# Patient Record
Sex: FEMALE | Race: BLACK OR AFRICAN AMERICAN | NOT HISPANIC OR LATINO | Employment: UNEMPLOYED | URBAN - METROPOLITAN AREA
[De-identification: names, ages, dates, MRNs, and addresses within clinical notes are randomized per-mention and may not be internally consistent; named-entity substitution may affect disease eponyms.]

---

## 2018-06-22 ENCOUNTER — OFFICE VISIT (OUTPATIENT)
Dept: FAMILY MEDICINE CLINIC | Facility: CLINIC | Age: 43
End: 2018-06-22

## 2018-06-22 ENCOUNTER — LAB (OUTPATIENT)
Dept: LAB | Facility: HOSPITAL | Age: 43
End: 2018-06-22

## 2018-06-22 VITALS
DIASTOLIC BLOOD PRESSURE: 64 MMHG | TEMPERATURE: 97.4 F | BODY MASS INDEX: 40.26 KG/M2 | HEIGHT: 63 IN | HEART RATE: 75 BPM | RESPIRATION RATE: 20 BRPM | WEIGHT: 227.2 LBS | OXYGEN SATURATION: 99 % | SYSTOLIC BLOOD PRESSURE: 118 MMHG

## 2018-06-22 DIAGNOSIS — L83 ACANTHOSIS NIGRICANS: ICD-10-CM

## 2018-06-22 DIAGNOSIS — M79.89 FOOT SWELLING: ICD-10-CM

## 2018-06-22 DIAGNOSIS — L70.0 ACNE VULGARIS: Primary | ICD-10-CM

## 2018-06-22 DIAGNOSIS — IMO0001 CLASS 3 OBESITY DUE TO EXCESS CALORIES WITHOUT SERIOUS COMORBIDITY WITH BODY MASS INDEX (BMI) OF 40.0 TO 44.9 IN ADULT: ICD-10-CM

## 2018-06-22 PROCEDURE — 99203 OFFICE O/P NEW LOW 30 MIN: CPT | Performed by: FAMILY MEDICINE

## 2018-06-22 PROCEDURE — 83036 HEMOGLOBIN GLYCOSYLATED A1C: CPT

## 2018-06-22 PROCEDURE — 36415 COLL VENOUS BLD VENIPUNCTURE: CPT

## 2018-06-22 RX ORDER — CYCLOBENZAPRINE HCL 10 MG
10 TABLET ORAL DAILY PRN
COMMUNITY
End: 2021-03-30

## 2018-06-22 RX ORDER — METHOCARBAMOL 750 MG/1
1 TABLET, FILM COATED ORAL DAILY
COMMUNITY
Start: 2018-05-31 | End: 2021-03-30

## 2018-06-22 NOTE — PROGRESS NOTES
H/o irregular cells on pap smears. Planning on tubes tied, but unable to do procedure. Not currently sexually active or planning on pregnancy. Has 2 twin boys.     Back pain:  Major, ongoing back pain for 3 years. After birth of sons back pain worse. Couldn't sit or stand for too long, feels stuck like can't stretch out. Followed by chiropractor., back adjusted once a month up to 2 times a month. Deteriorating bone.  Sciatica mainly right side, sometimes both. Month and half ago went to ER for severe pain, given Toradol and helped. Constant, daily pain. Planning core exercises, but difficulty doing them. Has to roll out of bed in morning. Stiffness in the morning. Start PT next week. Takes muscle relaxer at night to help sleep, new med methocarbamol, previous cyclobenazprine sometimes because it knocks her out.     Hips also go in and out of place, has to be adjusted.     Worried about arthritis in bilateral shoulders and back. No h/o scoliosis.     Cysts under arm:  Left side, 4. No pain. No drainage. Started during pregnancy. No h/o infection.    Acne:  Onset as teenager. Itchying on chest. Involved areas include face and chest. Occasional arms, no known on back. No cysts. Previous used Kerrie, Clinique, and OTC salicylic acid. Worse with caffeine (coffee and sweet tea) or before periods.

## 2018-06-22 NOTE — PROGRESS NOTES
Crissy Grady presents today to establish care.    Chief Complaint   Patient presents with   • Establish Care        HPI   Abnormal pap smears.  H/o irregular cells on pap smears. Planning on tubes tied, but unable to do procedure. Not currently sexually active or planning on pregnancy. Has 2 twin boys. Last saw GYN 2015.     Back pain:  Major, ongoing back pain for 3 years.  After birth of sons back pain worse. Couldn't sit or stand for too long, feels stuck like can't stretch out. Followed by chiropractor., back adjusted once a month up to 2 times a month. Deteriorating bone.  Sciatica mainly right side, sometimes both. Month and half ago went to ER for severe pain, given Toradol and helped. Constant, daily pain. Planning core exercises, but difficulty doing them. Has to roll out of bed in morning. Stiffness in the morning. Start PT next week. Takes muscle relaxer at night to help sleep, new med methocarbamol, previous cyclobenzaprine sometimes because it knocks her out.     Hips also go in and out of place, has to be adjusted.     Worried about arthritis in bilateral shoulders and back. No h/o scoliosis.     Cysts under arm:  Left side, 4. No pain. No drainage. Started during pregnancy. No h/o infection.    Acne:  Onset as teenager. Itching on chest. Involved areas include face and chest. Occasional arms, no known on back. No cysts. Previous used Kerrie, Clinique, and OTC salicylic acid. Worse with caffeine (coffee and sweet tea) or before periods.     Foot:  Swelling started during pregnancy. Denies pain.     Review of Systems   Constitutional: Negative.    HENT: Negative.    Eyes: Negative.    Respiratory: Negative.    Cardiovascular: Negative.    Gastrointestinal: Negative.    Endocrine: Negative.    Genitourinary: Negative.    Musculoskeletal: Positive for back pain and joint swelling.   Skin:        Acne   Neurological: Negative.    Hematological: Negative.    Psychiatric/Behavioral: Negative.         Past  "Medical History:   Diagnosis Date   • Abnormal Pap smear of cervix    • Plantar fasciitis         Past Surgical History:   Procedure Laterality Date   •  SECTION      2013   • LEEP       &         Family History   Problem Relation Age of Onset   • Hypertension Mother    • Hypertension Father    • Cancer Paternal Grandfather    • Diabetes Maternal Grandmother         Social History     Social History   • Marital status: Single     Spouse name: N/A   • Number of children: N/A   • Years of education: N/A     Occupational History   • Not on file.     Social History Main Topics   • Smoking status: Never Smoker   • Smokeless tobacco: Never Used   • Alcohol use No   • Drug use: No   • Sexual activity: Defer     Other Topics Concern   • Not on file     Social History Narrative   • No narrative on file        Prior Outpatient Prescriptions   Medication Sig Dispense Refill   • cyclobenzaprine (FLEXERIL) 10 MG tablet Take 10 mg by mouth Daily As Needed for Muscle Spasms.     • methocarbamol (ROBAXIN) 750 MG tablet Take 1 tablet by mouth Daily.       Current Outpatient Prescriptions   Medication Sig Dispense Refill   • cyclobenzaprine (FLEXERIL) 10 MG tablet Take 10 mg by mouth Daily As Needed for Muscle Spasms.     • methocarbamol (ROBAXIN) 750 MG tablet Take 1 tablet by mouth Daily.     • benzoyl peroxide 5 % gel Apply  topically Daily. 56.7 g 11     No current facility-administered medications for this visit.              Allergies no known allergies     Visit Vitals  /64 (BP Location: Right arm, Patient Position: Sitting)   Pulse 75   Temp 97.4 °F (36.3 °C) (Temporal Artery )   Resp 20   Ht 159.5 cm (62.8\")   Wt 103 kg (227 lb 3.2 oz)   LMP 2018   SpO2 99%   Breastfeeding? No   BMI 40.51 kg/m²        Physical Exam   Constitutional: No distress.   Obese   Neck: No thyromegaly present.   Cardiovascular: Normal rate, regular rhythm and intact distal pulses.    No murmur heard.  Pulmonary/Chest: " Effort normal and breath sounds normal.   Musculoskeletal:        Right ankle: She exhibits swelling.        Left ankle: She exhibits swelling.        Lumbar back: She exhibits tenderness. She exhibits no spasm. Bony tenderness: mild.   Bilateral ankle anterior to lateral malleolus soft tissue swelling indistinct, nontender, non-erythematous   Lymphadenopathy:     She has no cervical adenopathy.   Neurological: Gait normal.   Skin: Skin is warm and dry.   Facial and acne with numerous open and closed comedones, mild postinflammatory macules, no cysts or nodules.  Posterior upper back open and closed comedones.  Posterior and lateral neck folds, bilateral axilla increased hyperpigmentation consistent with acanthosis nigricans.    Psychiatric: She has a normal mood and affect. Her behavior is normal. Judgment and thought content normal. Cognition and memory are normal.   Vitals reviewed.       No results found for this or any previous visit.     Crissy was seen today for establish care.    Diagnoses and all orders for this visit:    Acne vulgaris  -     benzoyl peroxide 5 % gel; Apply  topically Daily.  Discussed use of Retin-A cream for comedone acne risks and benefits, she decided on other options at this time.  Start benzoyl peroxide, cautioned on bleaching properties.  Advised may take several weeks to notice improvement.  Acanthosis nigricans  -     Hemoglobin A1c; Future  Discussed skin changes are concerning for insulin resistance.  Check labs today.  Family history of grandmother with diabetes.  Class 3 obesity due to excess calories without serious comorbidity with body mass index (BMI) of 40.0 to 44.9 in adult  -     Hemoglobin A1c; Future  Discussed changing sweetened drinks.   Foot swelling  -     Ambulatory Referral to Podiatry  Unclear etiology, recommend further evaluation with podiatry.

## 2018-06-25 ENCOUNTER — TELEPHONE (OUTPATIENT)
Dept: FAMILY MEDICINE CLINIC | Facility: CLINIC | Age: 43
End: 2018-06-25

## 2018-06-25 DIAGNOSIS — L70.0 ACNE VULGARIS: Primary | ICD-10-CM

## 2018-06-25 NOTE — TELEPHONE ENCOUNTER
Called pt. Let her know that Dr Carol Roldan sent in a script for Trentinoin cream to be used at night. Pt verbalized understanding and had no further questions.

## 2018-06-25 NOTE — TELEPHONE ENCOUNTER
Patient called and is wanting to know if Dr. Frazier can change her prescription from the Benzoyl Peroxide 5 % gel to the other medication that they discussed. She couldn't remember the name but she said it is supposed to peel the skin. Wants it sent to [facility] on [facility].

## 2018-06-27 ENCOUNTER — TRANSCRIBE ORDERS (OUTPATIENT)
Dept: PHYSICAL THERAPY | Facility: HOSPITAL | Age: 43
End: 2018-06-27

## 2018-06-27 ENCOUNTER — HOSPITAL ENCOUNTER (OUTPATIENT)
Dept: PHYSICAL THERAPY | Facility: HOSPITAL | Age: 43
Setting detail: THERAPIES SERIES
Discharge: HOME | End: 2018-06-27

## 2018-06-27 DIAGNOSIS — G89.29 CHRONIC BILATERAL LOW BACK PAIN WITH RIGHT-SIDED SCIATICA: Primary | ICD-10-CM

## 2018-06-27 DIAGNOSIS — M54.5 LOW BACK PAIN, UNSPECIFIED BACK PAIN LATERALITY, UNSPECIFIED CHRONICITY, WITH SCIATICA PRESENCE UNSPECIFIED: Primary | ICD-10-CM

## 2018-06-27 DIAGNOSIS — M54.41 CHRONIC BILATERAL LOW BACK PAIN WITH RIGHT-SIDED SCIATICA: Primary | ICD-10-CM

## 2018-06-27 LAB — HBA1C MFR BLD: 5.7 % (ref 4.8–5.6)

## 2018-06-27 PROCEDURE — 97161 PT EVAL LOW COMPLEX 20 MIN: CPT | Performed by: PHYSICAL THERAPIST

## 2018-06-27 PROCEDURE — 97110 THERAPEUTIC EXERCISES: CPT | Performed by: PHYSICAL THERAPIST

## 2018-06-27 NOTE — THERAPY EVALUATION
"    Outpatient Physical Therapy Ortho Initial Evaluation  [facility]     Patient Name: Crissy Grady  : 1975  MRN: [id number]  Today's Date: 2018      Visit Date: 2018    Patient Active Problem List   Diagnosis   • Acanthosis nigricans   • Acne vulgaris        Past Medical History:   Diagnosis Date   • Abnormal Pap smear of cervix    • Plantar fasciitis         Past Surgical History:   Procedure Laterality Date   •  SECTION         • LEEP       &        Visit Dx:     ICD-10-CM ICD-9-CM   1. Chronic bilateral low back pain with right-sided sciatica M54.41 724.2    G89.29 724.3     338.29             Patient History     Row Name 18 1000             History    Chief Complaint Pain  -MARIETTA      Type of Pain Back pain  -MARIETTA      Date Current Problem(s) Began 06/27/15   \" 3 years\"  -MARIETTA      Brief Description of Current Complaint Patient has been receiving chiro care x 3 years for lower back pain with LE symptoms.  She has tried to exercise but has had difficulty beginning.  She gets temporary relief with chiro (3 days).    -MARIETTA      Patient/Caregiver Goals Relieve pain  -MARIETTA      Current Tobacco Use nonuser  -MARIETTA      Patient's Rating of General Health Fair  -MRAIETTA      Hand Dominance right-handed  -MARIETTA      Occupation/sports/leisure activities [employer]  -MARIETTA      What clinical tests have you had for this problem? X-ray  -MARIETTA      Results of Clinical Tests chiro  -MARIETTA      Are you or can you be pregnant No  -MARIETTA         Pain     Pain Location Back  -MARIETTA      Pain at Present 6  -MARIETTA      Pain at Best 6  -MARIETTA      Pain at Worst 7;8  -MARIETTA      Pain Frequency Constant/continuous  -MARIETTA      Pain Description Sharp;Numbness;Tingling;Throbbing  -MARIETTA      What Performance Factors Make the Current Problem(s) WORSE? prolonged sitting/standing  -MARIETTA      What Performance Factors Make the Current Problem(s) BETTER? walking/moving  -MARIETTA      Difficulties at work? prolonged standing, lifting  -MARIETTA      " "Difficulties with ADL's? dressing, cooking, cleaning, getting in and out of the bathtub  -MARIETTA      Difficulties with recreational activities? playing with kids  -MARIETTA         Fall Risk Assessment    Any falls in the past year: No  -MARIETTA         Daily Activities    Primary Language English  -MARIETTA      Pt Participated in POC and Goals Yes  -MARIETTA         Safety    Are you being hurt, hit, or frightened by anyone at home or in your life? No  -MARIETTA        User Key  (r) = Recorded By, (t) = Taken By, (c) = Cosigned By    Initials Name Provider Type    MARIETTA Shaw, PT Physical Therapist                PT Ortho     Row Name 06/27/18 1000       Subjective Pain    Pre-Treatment Pain Level 6  -MARIETTA    Post-Treatment Pain Level 6  -MARIETTA       Posture/Observations    Posture/Observations Comments OW female with hyperlordotic posture, flat thoracic spine.  -MARIETTA       Quarter Clearing    Quarter Clearing Lower Quarter Clearing  -MARIETTA       DTR- Lower Quarter Clearing    Patellar tendon (L2-4) Bilateral:;1- Minimal response  -MARIETTA    Achilles tendon (S1-2) Bilateral:;1- Minimal response  -MARIETTA       Neural Tension Signs- Lower Quarter Clearing    Slump Negative  -MARIETTA    SLR Negative  -MARIETTA       Pathological Reflexes- Lower Quarter Clearing    Clonus Negative  -MARIETTA    Montano Negative  -MARIETTA       Myotomal Screen- Lower Quarter Clearing    Hip flexion (L2) Left:;4 (Good);Right:;4+ (Good +)   requires cueing for max effort  -MARIETTA    Knee extension (L3) Bilateral:;5 (Normal)  -MARIETTA    Ankle DF (L4) Bilateral:;5 (Normal)  -MARIETTA    Great toe extension (L5) Bilateral:;WNL  -MARIETTA    Knee flexion (S2) Left:;4+ (Good +);Right:;5 (Normal)  -MARIETTA       Lumbar ROM Screen- Lower Quarter Clearing    Lumbar Flexion Normal   pain  -MARIETTA    Lumbar Extension Impaired   pain 10 degrees  -MARIETTA    Lumbar Lateral Flexion Impaired   3\" above joint left 1\" above right  -MARIETTA       SI/Hip Screen- Lower Quarter Clearing    ASIS compression Positive  -MARIETTA    Alan's/Ahsan's test Negative  -MARIETTA "    Posterior thigh sheer Negative  -MARIETTA       Special Tests/Palpation    Special Tests/Palpation Hip;Lumbar/SI  -MARIETTA       Lumbosacral Palpation    Lumbosacral Segment Bilateral:;Guarded/taut  -MARIETTA    Thoracolumbar Segment Bilateral:;Guarded/taut  -MARIETTA    Erector Spinae (Paraspinals) Bilateral:;Tender  -MARIETTA    Hamstring Bilateral:;Guarded/taut   distally  -MARIETTA    Lumbosacral Palpation? Yes  -MARIETTA       Lumbar/SI Special Tests    Sacral Spring Test (SI Dysfunction) Bilateral:;Positive  -MARIETTA       Hip/Thigh Palpation    Greater Trochanter Bilateral:;Tender  -MARIETTA    Gluteus Nestor Bilateral:;Guarded/taut  -MARIETTA    Piriformis Bilateral:;Guarded/taut;Tender  -MARIETTA    SI Bilateral:;Tender  -MARIETTA    Hip/Thigh Palpation? Yes  -MARIETTA       Hip Special Tests    Piriformis test (piriformis syndrome) Bilateral:;Positive  -MARIETTA       MMT (Manual Muscle Testing)    Additional Documentation General Assessment (Manual Muscle Testing) (Group)  -MARIETTA       General Assessment (Manual Muscle Testing)    General Manual Muscle Testing (MMT) Assessment lower extremity strength deficits identified  -MARIETTA       Lower Extremity (Manual Muscle Testing)    Comment, MMT: Lower Extremity glute max 3-/5 bilateral  -MARIETTA       Flexibility    Flexibility Tested? Lower Extremity  -MARIETTA       Lower Extremity Flexibility    Hamstrings Bilateral:;WNL  -MARIETTA    Hip External Rotators Bilateral:;WNL  -MARIETTA    Hip Internal Rotators Bilateral:;WNL  -MARIETTA       Gait/Stairs Assessment/Training    Comment (Gait/Stairs) decreased trunk rotation and arm swing, mild right hip drop during left stance phase  -MARIETTA      User Key  (r) = Recorded By, (t) = Taken By, (c) = Cosigned By    Initials Name Provider Type    MARIETTA Shaw, PT Physical Therapist                      Therapy Education  Education Details: initiated HEP per exercise flowsheet  Given: HEP  Program: New  How Provided: Verbal, Demonstration, Written  Provided to: Patient  Level of Understanding: Verbalized, Demonstrated            PT OP Goals     Row Name 06/27/18 1100          PT Short Term Goals    STG Date to Achieve 07/11/18  -MARIETTA     STG 1 Patient to report pre treatment pain <6/10  -MARIETTA     STG 1 Progress New  -MARIETTA     STG 2 Patient to be compliant with initial HEP to decrease her pain and symptoms  -MARIETTA     STG 2 Progress New  -MARIETTA        Long Term Goals    LTG Date to Achieve 07/25/18  -MARIETTA     LTG 1 Patient to be independent with final HEP  -MARIETTA     LTG 1 Progress New  -MARIETTA     LTG 2 Patient to report improved walking tolerance  -MARIETTA     LTG 2 Progress New  -MARIETTA     LTG 3 Patient to report decreased LE symptom frequency  -MARIETTA     LTG 3 Progress New  -MARIETTA     LTG 4 Patient to demonstrate pain free lumbar extension  -MARIETTA     LTG 4 Progress New  -MARIETTA     LTG 5 Patient to improve mod oswestry score to at least 40%  -MARIETTA     LTG 5 Progress New  -MARIETTA        Time Calculation    PT Goal Re-Cert Due Date 09/25/18  -MARIETTA       User Key  (r) = Recorded By, (t) = Taken By, (c) = Cosigned By    Initials Name Provider Type    MARIETTA Shaw, PT Physical Therapist                PT Assessment/Plan     Row Name 06/27/18 1100          PT Assessment    Functional Limitations Performance in work activities;Performance in self-care ADL;Performance in leisure activities;Limitations in functional capacity and performance;Impaired gait;Limitation in home management;Limitations in community activities  -MARIETTA     Impairments Posture;Poor body mechanics;Pain;Sensation;Edema;Gait;Impaired postural alignment;Impaired reflex integrity;Joint mobility;Locomotion  -MARIETTA     Assessment Comments Patient presents with chronic low back pain and 3 years history of chiro care.  SHe demonstrates lumbosacral reactivity and inability to perform core exercises.  She demonstrates moderate releif with prone press up as well as flexion based exercises.  Signs are inconsistent at times and there are barriers to include understanding her condition based on chiro information.  Patient would  benefit from directional preference exercises and an appropriate core strengthening program  -MARIETTA     Please refer to paper survey for additional self-reported information Yes  -MARIETTA     Rehab Potential Fair  -MARIETTA     Patient/caregiver participated in establishment of treatment plan and goals Yes  -MARIETTA     Patient would benefit from skilled therapy intervention Yes  -MARIETTA        PT Plan    PT Frequency 2x/week;1x/week  -MARIETTA     Predicted Duration of Therapy Intervention (Therapy Eval) 6-8 visits  -MARIETTA     Planned CPT's? PT EVAL LOW COMPLEXITY: 32849;PT THER PROC EA 15 MIN: 75016;PT MANUAL THERAPY EA 15 MIN: 63234;PT ELECTRICAL STIM UNATTEND: ;PT ULTRASOUND EA 15 MIN: 79674;PT TRACTION LUMBAR: 86382;PT HOT/COLD PACK WC NONMCARE: 80131  -MARIETTA     PT Plan Comments directional preference exercises, appropraite core stabilization, manual and modalities as indicated.  -MARIETTA       User Key  (r) = Recorded By, (t) = Taken By, (c) = Cosigned By    Initials Name Provider Type    MARIETTA Shaw, PT Physical Therapist                  Exercises     Row Name 06/27/18 1100 06/27/18 1000          Subjective Pain    Pre-Treatment Pain Level  -- 6  -MARIETTA     Post-Treatment Pain Level  -- 6  -MARIETTA        Total Minutes    31769 - PT Therapeutic Exercise Minutes  -- 5  -MARIETTA     73835 - PT Manual Therapy Minutes 4  -MARIETTA  --        Exercise 2    Exercise Name 2  -- prone press ups  -MARIETTA     Sets 2  -- 2  -MARIETTA     Reps 2  -- 8  -MARIETTA        Exercise 3    Exercise Name 3  -- prone heel squeeze  -MARIETTA     Reps 3  -- 15  -MARIETTA     Time 3  -- 3 seconds  -MARIETTA       User Key  (r) = Recorded By, (t) = Taken By, (c) = Cosigned By    Initials Name Provider Type    MARIETTA Shaw, PT Physical Therapist           Manual Rx (last 36 hours)      Manual Treatments     Row Name 06/27/18 1100             Total Minutes    56897 - PT Manual Therapy Minutes 4  -MARIETTA         Manual Rx 1    Manual Rx 1 Location posterior innominate glide in prone with reassessment  -MARIETTA       Manual Rx 1 Grade 10 x 2  -MARIETTA        User Key  (r) = Recorded By, (t) = Taken By, (c) = Cosigned By    Initials Name Provider Type    MARIETTA Shaw, PT Physical Therapist                                Time Calculation:     Therapy Suggested Charges     Code   Minutes Charges    12109 (CPT®) Hc Pt Neuromusc Re Education Ea 15 Min      74808 (CPT®) Hc Pt Ther Proc Ea 15 Min 5 1    37900 (CPT®) Hc Gait Training Ea 15 Min      10682 (CPT®) Hc Pt Therapeutic Act Ea 15 Min      48646 (CPT®) Hc Pt Manual Therapy Ea 15 Min 4     45823 (CPT®) Hc Pt Ther Massage- Per 15 Min      96068 (CPT®) Hc Pt Iontophoresis Ea 15 Min      07833 (CPT®) Hc Pt Elec Stim Ea-Per 15 Min      69046 (CPT®) Hc Pt Ultrasound Ea 15 Min      92412 (CPT®) Hc Pt Self Care/Mgmt/Train Ea 15 Min      Total  9 1          Start Time: 1025     Therapy Charges for Today     Code Description Service Date Service Provider Modifiers Qty    90633316386 HC PT THER PROC EA 15 MIN 6/27/2018 Daron Shaw, PT GP 1    60669268216 HC PT EVAL LOW COMPLEXITY 3 6/27/2018 Daron Shaw, PT GP 1                    Daron Shaw, PT  6/27/2018

## 2018-06-28 NOTE — TELEPHONE ENCOUNTER
PER PHARMACY HER ISURANCE WILL ONLY PAY FOR THIS MED FOR SOMEONE 35YRS OLD AN UMDER ONLY. PHARMACY IS SENDING US A FAX PLUS THE PT SAYS THERE'S A FORM THAT WE COULD FILL OUT HERE IF NEED BE. PLEASE CHECK AN CALL THE PATIENT-PHARMACY BACK. [facility]

## 2018-06-29 NOTE — TELEPHONE ENCOUNTER
Please start PA. Diagnosis acne vulgaris grade 4. Previous treatment salicylic acid, benzoyl peroxide.

## 2018-07-06 ENCOUNTER — APPOINTMENT (OUTPATIENT)
Dept: PHYSICAL THERAPY | Facility: HOSPITAL | Age: 43
End: 2018-07-06

## 2018-07-19 ENCOUNTER — TELEPHONE (OUTPATIENT)
Dept: FAMILY MEDICINE CLINIC | Facility: CLINIC | Age: 43
End: 2018-07-19

## 2018-07-23 ENCOUNTER — TELEPHONE (OUTPATIENT)
Dept: FAMILY MEDICINE CLINIC | Facility: CLINIC | Age: 43
End: 2018-07-23

## 2018-07-23 RX ORDER — ADAPALENE 45 G/G
GEL TOPICAL NIGHTLY
Qty: 45 G | Refills: 11 | Status: SHIPPED | OUTPATIENT
Start: 2018-07-23

## 2018-07-25 NOTE — TELEPHONE ENCOUNTER
Spoke with pt's mother, stated she would give pt message to call the clinic due to pt not being available at present time.

## 2018-08-31 ENCOUNTER — DOCUMENTATION (OUTPATIENT)
Dept: PHYSICAL THERAPY | Facility: HOSPITAL | Age: 43
End: 2018-08-31

## 2018-08-31 DIAGNOSIS — G89.29 CHRONIC BILATERAL LOW BACK PAIN WITH RIGHT-SIDED SCIATICA: Primary | ICD-10-CM

## 2018-08-31 DIAGNOSIS — M54.41 CHRONIC BILATERAL LOW BACK PAIN WITH RIGHT-SIDED SCIATICA: Primary | ICD-10-CM

## 2020-06-17 ENCOUNTER — TELEPHONE (OUTPATIENT)
Dept: GYNECOLOGIC ONCOLOGY | Facility: CLINIC | Age: 45
End: 2020-06-17

## 2020-06-17 NOTE — TELEPHONE ENCOUNTER
Pt is calling to make appt with Dr. Bernstein, she was last seen in 2015.    Pt is available any day during the week, around 10am, if possible, or any open times.    Phone #  [phone]

## 2021-03-30 ENCOUNTER — OFFICE VISIT (OUTPATIENT)
Dept: BARIATRICS/WEIGHT MGMT | Facility: CLINIC | Age: 46
End: 2021-03-30

## 2021-03-30 VITALS
WEIGHT: 242 LBS | BODY MASS INDEX: 47.51 KG/M2 | TEMPERATURE: 98 F | SYSTOLIC BLOOD PRESSURE: 114 MMHG | HEART RATE: 108 BPM | RESPIRATION RATE: 18 BRPM | HEIGHT: 60 IN | OXYGEN SATURATION: 99 % | DIASTOLIC BLOOD PRESSURE: 66 MMHG

## 2021-03-30 DIAGNOSIS — R10.13 DYSPEPSIA: ICD-10-CM

## 2021-03-30 DIAGNOSIS — M54.9 CHRONIC BACK PAIN, UNSPECIFIED BACK LOCATION, UNSPECIFIED BACK PAIN LATERALITY: ICD-10-CM

## 2021-03-30 DIAGNOSIS — E66.01 OBESITY, CLASS III, BMI 40-49.9 (MORBID OBESITY) (HCC): Primary | ICD-10-CM

## 2021-03-30 DIAGNOSIS — R06.09 DYSPNEA ON EXERTION: ICD-10-CM

## 2021-03-30 DIAGNOSIS — G89.29 CHRONIC BACK PAIN, UNSPECIFIED BACK LOCATION, UNSPECIFIED BACK PAIN LATERALITY: ICD-10-CM

## 2021-03-30 DIAGNOSIS — R53.83 FATIGUE, UNSPECIFIED TYPE: ICD-10-CM

## 2021-03-30 PROCEDURE — 99204 OFFICE O/P NEW MOD 45 MIN: CPT | Performed by: PHYSICIAN ASSISTANT

## 2021-03-30 RX ORDER — CYCLOBENZAPRINE HCL 10 MG
10 TABLET ORAL 2 TIMES DAILY PRN
COMMUNITY
Start: 2020-10-28

## 2021-03-30 RX ORDER — MELOXICAM 15 MG/1
TABLET ORAL
COMMUNITY
Start: 2020-12-22

## 2021-03-30 RX ORDER — HYDROCODONE BITARTRATE AND ACETAMINOPHEN 5; 325 MG/1; MG/1
1 TABLET ORAL EVERY 6 HOURS PRN
COMMUNITY
Start: 2020-12-05

## 2021-03-30 RX ORDER — DICLOFENAC SODIUM 75 MG/1
75 TABLET, DELAYED RELEASE ORAL 2 TIMES DAILY
COMMUNITY
Start: 2021-03-07 | End: 2022-06-07

## 2021-03-30 RX ORDER — TRETINOIN 0.5 MG/G
CREAM TOPICAL
COMMUNITY
Start: 2020-10-09 | End: 2022-03-08

## 2021-03-30 RX ORDER — FLUTICASONE PROPIONATE 50 MCG
1 SPRAY, SUSPENSION (ML) NASAL DAILY
COMMUNITY
Start: 2019-05-09 | End: 2023-01-30

## 2021-03-30 RX ORDER — METHOCARBAMOL 750 MG/1
750 TABLET, FILM COATED ORAL 4 TIMES DAILY PRN
COMMUNITY
End: 2022-03-08

## 2021-03-30 RX ORDER — CYCLOSPORINE 0.5 MG/ML
1 EMULSION OPHTHALMIC EVERY 12 HOURS
COMMUNITY
Start: 2020-11-18

## 2021-03-30 NOTE — PROGRESS NOTES
"[facility] BARIATRIC SURGERY  [address]  [address]  [facility] [id number] [phone]      Patient  Name:  Crissy Grady  :  1975      Date of Visit: 2021      Chief Complaint:  weight gain; unable to maintain weight loss      History of Present Illness:  Crissy Grady is a 45 y.o. female who presents today for evaluation, education and consultation regarding metabolic and bariatric surgery with Dr. Laurent.     Crissy has been overweight for at least 6 years, has been 100 pounds or more overweight for 6 or more years.  The most weight Crissy lost was 60 pounds but was unable to maintain that weight loss.  Her maximum lifetime weight is 242 pounds - current weight.       Complete history has been obtained and discussed today, as pertinent to metabolic/ bariatric surgery.     Past Medical History:   Diagnosis Date   • Abnormal Pap smear of cervix    • Chronic back pain     manages w/ NSAIDS, avoids steroids   • Dyspepsia    • Dyspnea on exertion    • Fatigue    • Joint pain     knee \"bone on bone\"   • Morbid obesity (CMS/HCC)    • Plantar fasciitis      Past Surgical History:   Procedure Laterality Date   •  SECTION      twins   • LEEP         No Known Allergies    Current Outpatient Medications:   •  cyclobenzaprine (FLEXERIL) 10 MG tablet, Take 10 mg by mouth 2 (Two) Times a Day As Needed., Disp: , Rfl:   •  cycloSPORINE (Restasis) 0.05 % ophthalmic emulsion, Administer 1 drop to both eyes Every 12 (Twelve) Hours., Disp: , Rfl:   •  diclofenac (VOLTAREN) 75 MG EC tablet, Take 75 mg by mouth 2 (Two) Times a Day., Disp: , Rfl:   •  fluticasone (FLONASE) 50 MCG/ACT nasal spray, 1 spray into the nostril(s) as directed by provider Daily., Disp: , Rfl:   •  meloxicam (MOBIC) 15 MG tablet, TAKE 1 TABLET BY MOUTH ONCE DAILY AS NEEDED FOR PAIN, Disp: , Rfl:   •  tretinoin (RETIN-A) 0.05 % cream, APPLY A PEA SIZED AMOUNT TO THE FACE NIGHTLY AS TOLERATED, Disp: , Rfl: "   •  adapalene (DIFFERIN) 0.1 % gel, Apply  topically Every Night., Disp: 45 g, Rfl: 11  •  HYDROcodone-acetaminophen (NORCO) 5-325 MG per tablet, Take 1 tablet by mouth Every 6 (Six) Hours As Needed., Disp: , Rfl:   •  methocarbamol (ROBAXIN) 750 MG tablet, Take 750 mg by mouth 4 (Four) Times a Day As Needed for Muscle Spasms., Disp: , Rfl:     Social History     Socioeconomic History   • Marital status: Single     Spouse name: Not on file   • Number of children: Not on file   • Years of education: Not on file   • Highest education level: Not on file   Tobacco Use   • Smoking status: Never Smoker   • Smokeless tobacco: Never Used   Substance and Sexual Activity   • Alcohol use: No   • Drug use: No   • Sexual activity: Defer     Birth control/protection: None     Family History   Problem Relation Age of Onset   • Hypertension Mother    • Hypertension Father    • Sleep apnea Father    • Cancer Paternal Grandfather    • Diabetes Maternal Grandmother        Review of Systems:  Constitutional:  reports fatigue, weight gain and denies fevers, chills.  HEENT:  denies headache, ear pain or loss of hearing, blurred or double vision, nasal discharge or sore throat.  Cardiovascular:   denies HTN, hx heart disease, chest pain, hx DVT.  Respiratory:   denies cough , wheezing, sleep apnea, asthma, hx PE.  Gastrointestinal:   denies dysphagia, heartburn, nausea, vomiting, abdominal pain, IBS, gallbladder issues, liver disease.  Genitourinary:   denies history of  frequent UTI, incontinence, hematuria, dysuria, polyuria, polydipsia, renal insufficiency.    Musculoskeletal:  reports joint pain, back pain and denies fibromyalgia and autoimmune disease.  Neurological:   denies migraines, numbness /tingling, dizziness, confusion, seizure.  Psychiatric:  denies depressed mood, hx depression, feeling anxious, hx anxiety, bipolar disorder.  Endocrine:   denies glucose intolerance, diabetes, thyroid disease.  Hematologic:   denies  bruising, bleeding disorder, hx anemia, hx blood transfusion.  Skin:   denies rashes, hx MRSA.    Physical Exam:  Vital Signs:  Weight: 110 kg (242 lb)   Body mass index is 47.26 kg/m².  Temp: 98 °F (36.7 °C)   Heart Rate: 108   BP: 114/66     Physical Exam  Vitals reviewed.   Constitutional:       Appearance: She is well-developed.      Comments: not wearing a mask   HENT:      Head: Normocephalic and atraumatic.   Eyes:      General: No scleral icterus.     Conjunctiva/sclera: Conjunctivae normal.   Neck:      Thyroid: No thyromegaly.   Cardiovascular:      Rate and Rhythm: Normal rate and regular rhythm.      Heart sounds: No murmur heard.     Pulmonary:      Effort: Pulmonary effort is normal. No respiratory distress.      Breath sounds: Normal breath sounds. No wheezing or rales.   Abdominal:      General: Bowel sounds are normal. There is no distension.      Palpations: Abdomen is soft. There is no mass.      Tenderness: There is no abdominal tenderness.      Hernia: No hernia is present.      Comments: scars: low transverse   Musculoskeletal:         General: Normal range of motion.      Cervical back: Neck supple.   Skin:     General: Skin is warm and dry.      Findings: No rash.   Neurological:      Mental Status: She is alert and oriented to person, place, and time.      Gait: Gait normal.   Psychiatric:         Judgment: Judgment normal.         Patient Active Problem List   Diagnosis   • Acanthosis nigricans   • Acne vulgaris   • Chronic back pain   • Fatigue   • Dyspepsia   • Dyspnea on exertion   • Morbid obesity (CMS/HCC)   • Plantar fasciitis   • Joint pain       Assessment:  45 y.o. female with medically complicated obesity pursuing sleeve gastrectomy.    Patient's Body mass index is 47.26 kg/m². BMI is above normal parameters. Recommendations include: MBS.  Metabolic & Bariatric Surgery is deemed medically necessary given the following obesity related comorbidities: chronic back pain.    Plan:   Patient understands that bariatric surgery is not cosmetic surgery but rather a tool to help make a lifelong commitment to lifestyle changes including diet, exercise, behavior modifications, and healthy habits.  The patient has been educated today on those expected postoperative lifestyle changes.  Psychological and Nutritional consultations will be arranged prior to surgery.  Instructions on how to access [email or web address] (an internet based site w/ educational surgical videos) were given to the patient.  Recommended vitamin supplementation was reviewed.  The importance of avoiding ASA/ NSAIDS/ steroids/ tobacco/ hormones/ immunomodulators perioperatively was discussed in detail.  All questions/concerns have been addressed.      Further evaluation will include: CBC, CMP, Lipids, TSH, HgA1C, H.Pylori UBT, EKG and CXR.    No additional clearances needed prior to surgery at this time.       Further input to follow pending the above.           HUSEYIN Esparza

## 2021-03-31 LAB
ALBUMIN SERPL-MCNC: 3.8 G/DL (ref 3.8–4.8)
ALBUMIN/GLOB SERPL: 1.2 {RATIO} (ref 1.2–2.2)
ALP SERPL-CCNC: 107 IU/L (ref 39–117)
ALT SERPL-CCNC: 14 IU/L (ref 0–32)
AST SERPL-CCNC: 13 IU/L (ref 0–40)
BASOPHILS # BLD AUTO: 0 X10E3/UL (ref 0–0.2)
BASOPHILS NFR BLD AUTO: 1 %
BILIRUB SERPL-MCNC: <0.2 MG/DL (ref 0–1.2)
BUN SERPL-MCNC: 11 MG/DL (ref 6–24)
BUN/CREAT SERPL: 13 (ref 9–23)
CALCIUM SERPL-MCNC: 9.4 MG/DL (ref 8.7–10.2)
CHLORIDE SERPL-SCNC: 104 MMOL/L (ref 96–106)
CHOLEST SERPL-MCNC: 143 MG/DL (ref 100–199)
CO2 SERPL-SCNC: 23 MMOL/L (ref 20–29)
CREAT SERPL-MCNC: 0.86 MG/DL (ref 0.57–1)
EOSINOPHIL # BLD AUTO: 0.2 X10E3/UL (ref 0–0.4)
EOSINOPHIL NFR BLD AUTO: 2 %
ERYTHROCYTE [DISTWIDTH] IN BLOOD BY AUTOMATED COUNT: 14.3 % (ref 11.7–15.4)
GLOBULIN SER CALC-MCNC: 3.3 G/DL (ref 1.5–4.5)
GLUCOSE SERPL-MCNC: 84 MG/DL (ref 65–99)
HBA1C MFR BLD: 5.6 % (ref 4.8–5.6)
HCT VFR BLD AUTO: 37.3 % (ref 34–46.6)
HDLC SERPL-MCNC: 59 MG/DL
HGB BLD-MCNC: 11.5 G/DL (ref 11.1–15.9)
IMM GRANULOCYTES # BLD AUTO: 0 X10E3/UL (ref 0–0.1)
IMM GRANULOCYTES NFR BLD AUTO: 0 %
LDLC SERPL CALC-MCNC: 69 MG/DL (ref 0–99)
LYMPHOCYTES # BLD AUTO: 2.2 X10E3/UL (ref 0.7–3.1)
LYMPHOCYTES NFR BLD AUTO: 25 %
MCH RBC QN AUTO: 25.2 PG (ref 26.6–33)
MCHC RBC AUTO-ENTMCNC: 30.8 G/DL (ref 31.5–35.7)
MCV RBC AUTO: 82 FL (ref 79–97)
MONOCYTES # BLD AUTO: 0.7 X10E3/UL (ref 0.1–0.9)
MONOCYTES NFR BLD AUTO: 8 %
NEUTROPHILS # BLD AUTO: 5.7 X10E3/UL (ref 1.4–7)
NEUTROPHILS NFR BLD AUTO: 64 %
PLATELET # BLD AUTO: 279 X10E3/UL (ref 150–450)
POTASSIUM SERPL-SCNC: 4.5 MMOL/L (ref 3.5–5.2)
PROT SERPL-MCNC: 7.1 G/DL (ref 6–8.5)
RBC # BLD AUTO: 4.57 X10E6/UL (ref 3.77–5.28)
SODIUM SERPL-SCNC: 141 MMOL/L (ref 134–144)
TRIGL SERPL-MCNC: 79 MG/DL (ref 0–149)
TSH SERPL DL<=0.005 MIU/L-ACNC: 1.63 UIU/ML (ref 0.45–4.5)
VLDLC SERPL CALC-MCNC: 15 MG/DL (ref 5–40)
WBC # BLD AUTO: 8.8 X10E3/UL (ref 3.4–10.8)

## 2021-04-07 LAB — UREA BREATH TEST QL: NEGATIVE

## 2021-04-13 ENCOUNTER — OFFICE VISIT (OUTPATIENT)
Dept: BARIATRICS/WEIGHT MGMT | Facility: CLINIC | Age: 46
End: 2021-04-13

## 2021-04-13 VITALS
WEIGHT: 242 LBS | TEMPERATURE: 98.4 F | BODY MASS INDEX: 47.51 KG/M2 | HEIGHT: 60 IN | SYSTOLIC BLOOD PRESSURE: 116 MMHG | DIASTOLIC BLOOD PRESSURE: 68 MMHG | HEART RATE: 90 BPM | OXYGEN SATURATION: 99 % | RESPIRATION RATE: 18 BRPM

## 2021-04-13 DIAGNOSIS — E66.01 OBESITY, CLASS III, BMI 40-49.9 (MORBID OBESITY) (HCC): Primary | ICD-10-CM

## 2021-04-13 PROCEDURE — 99213 OFFICE O/P EST LOW 20 MIN: CPT | Performed by: PHYSICIAN ASSISTANT

## 2021-04-13 NOTE — PROGRESS NOTES
"[facility] Bariatric Surgery  [address]  [address]  [facility] [id number] [phone]    Patient Name:  Crissy Grady.  :  1975        Reason for Visit:  Monthly Diet Visit #1     HPI:  Presents for monthly supervised diet visit.  Denies any medical issues since last visit.  Eating 3 times a day. Has a lot of caffeine, sweet tea, coffee and chocolate.   Breakfast- turkey carr and eggs or raisin bran cereal. Lunch- sandwich. Dinner- frozen vegetables and meat/ chicken/ turkey burger or casserole. Snacks on chocolate. Drinks orange juice and sweet tea. Exercises with leg lifts, etc. Difficultly with ambulation/ exercise.     Initial weight: 242      Past Medical History:   Diagnosis Date   • Abnormal Pap smear of cervix    • Chronic back pain     manages w/ NSAIDS, avoids steroids   • Dyspepsia    • Dyspnea on exertion    • Fatigue    • Joint pain     knee \"bone on bone\"   • Morbid obesity (CMS/HCC)    • Plantar fasciitis      Past Surgical History:   Procedure Laterality Date   •  SECTION      twins   • LEEP         No Known Allergies      Current Outpatient Medications:   •  adapalene (DIFFERIN) 0.1 % gel, Apply  topically Every Night., Disp: 45 g, Rfl: 11  •  cyclobenzaprine (FLEXERIL) 10 MG tablet, Take 10 mg by mouth 2 (Two) Times a Day As Needed., Disp: , Rfl:   •  cycloSPORINE (Restasis) 0.05 % ophthalmic emulsion, Administer 1 drop to both eyes Every 12 (Twelve) Hours., Disp: , Rfl:   •  diclofenac (VOLTAREN) 75 MG EC tablet, Take 75 mg by mouth 2 (Two) Times a Day., Disp: , Rfl:   •  fluticasone (FLONASE) 50 MCG/ACT nasal spray, 1 spray into the nostril(s) as directed by provider Daily., Disp: , Rfl:   •  HYDROcodone-acetaminophen (NORCO) 5-325 MG per tablet, Take 1 tablet by mouth Every 6 (Six) Hours As Needed., Disp: , Rfl:   •  meloxicam (MOBIC) 15 MG tablet, TAKE 1 TABLET BY MOUTH ONCE DAILY AS NEEDED FOR PAIN, Disp: , Rfl:   •  methocarbamol " "(ROBAXIN) 750 MG tablet, Take 750 mg by mouth 4 (Four) Times a Day As Needed for Muscle Spasms., Disp: , Rfl:   •  tretinoin (RETIN-A) 0.05 % cream, APPLY A PEA SIZED AMOUNT TO THE FACE NIGHTLY AS TOLERATED, Disp: , Rfl:       /68 (BP Location: Left arm, Patient Position: Sitting, Cuff Size: Adult)   Pulse 90   Temp 98.4 °F (36.9 °C) (Temporal)   Resp 18   Ht 152.4 cm (60\")   Wt 110 kg (242 lb)   SpO2 99%   BMI 47.26 kg/m²   Physical Exam  Constitutional:       Appearance: She is well-developed.   HENT:      Head: Normocephalic and atraumatic.   Cardiovascular:      Rate and Rhythm: Normal rate.   Pulmonary:      Effort: Pulmonary effort is normal.   Neurological:      Mental Status: She is alert and oriented to person, place, and time.   Psychiatric:         Behavior: Behavior normal.         Thought Content: Thought content normal.         Judgment: Judgment normal.           Assessment:   Pursuing Weight Loss Surgery.    ICD-10-CM ICD-9-CM   1. Obesity, Class III, BMI 40-49.9 (morbid obesity) (CMS/[facility])  E66.01 278.01       Discussion/Plan:  During diet appointments the patient is educated on high quality nutrition and habits to facilitate good health and possibly some weight loss. Necessary lifestyle changes and behavior modifications were discussed. Please note, the patient remains compliant in completing her diet requirements.     Goals:   1. Continue to be mindful of healthy food choices .   2. Increase daily protein intake and reduce carbs.  3. Increase daily exercise/activity as able.   4. Avoid HFCS     Follow up in 1 month. Call w/ issues/concerns.       "

## 2021-05-26 ENCOUNTER — OFFICE VISIT (OUTPATIENT)
Dept: BARIATRICS/WEIGHT MGMT | Facility: CLINIC | Age: 46
End: 2021-05-26

## 2021-05-26 VITALS
RESPIRATION RATE: 18 BRPM | HEART RATE: 82 BPM | BODY MASS INDEX: 47.61 KG/M2 | TEMPERATURE: 97.8 F | OXYGEN SATURATION: 98 % | DIASTOLIC BLOOD PRESSURE: 78 MMHG | WEIGHT: 242.5 LBS | HEIGHT: 60 IN | SYSTOLIC BLOOD PRESSURE: 124 MMHG

## 2021-05-26 DIAGNOSIS — E66.01 OBESITY, CLASS III, BMI 40-49.9 (MORBID OBESITY) (HCC): Primary | ICD-10-CM

## 2021-05-26 PROCEDURE — 99213 OFFICE O/P EST LOW 20 MIN: CPT | Performed by: SURGERY

## 2021-05-26 NOTE — PROGRESS NOTES
"[facility] Bariatric Surgery  [address]  [address]  [facility] [id number] [phone]      Patient Name:  Crissy Grady  :  1975      Date of Visit:  2021      Reason for Visit:  Monthly Diet Visit #2 of 6       HPI:  Presents for monthly supervised diet visit.  Pursuing metabolic and bariatric surgery w/ Dr. Laurent.    Denies any medical issues since last visit.  Focusing on decreasing simple sugars.  She loves sweet tea, coffee, and chocolate.    Initial weight: 242 lbs.  Current weight:  242 lbs.    Past Medical History:   Diagnosis Date   • Abnormal Pap smear of cervix    • Chronic back pain     manages w/ NSAIDS, avoids steroids   • Dyspepsia    • Dyspnea on exertion    • Fatigue    • Joint pain     knee \"bone on bone\"   • Morbid obesity (CMS/HCC)    • Plantar fasciitis      Past Surgical History:   Procedure Laterality Date   •  SECTION      twins   • LEEP         No Known Allergies      Current Outpatient Medications:   •  adapalene (DIFFERIN) 0.1 % gel, Apply  topically Every Night., Disp: 45 g, Rfl: 11  •  cyclobenzaprine (FLEXERIL) 10 MG tablet, Take 10 mg by mouth 2 (Two) Times a Day As Needed., Disp: , Rfl:   •  cycloSPORINE (Restasis) 0.05 % ophthalmic emulsion, Administer 1 drop to both eyes Every 12 (Twelve) Hours., Disp: , Rfl:   •  diclofenac (VOLTAREN) 75 MG EC tablet, Take 75 mg by mouth 2 (Two) Times a Day., Disp: , Rfl:   •  fluticasone (FLONASE) 50 MCG/ACT nasal spray, 1 spray into the nostril(s) as directed by provider Daily., Disp: , Rfl:   •  HYDROcodone-acetaminophen (NORCO) 5-325 MG per tablet, Take 1 tablet by mouth Every 6 (Six) Hours As Needed., Disp: , Rfl:   •  meloxicam (MOBIC) 15 MG tablet, TAKE 1 TABLET BY MOUTH ONCE DAILY AS NEEDED FOR PAIN, Disp: , Rfl:   •  methocarbamol (ROBAXIN) 750 MG tablet, Take 750 mg by mouth 4 (Four) Times a Day As Needed for Muscle Spasms., Disp: , Rfl:   •  tretinoin (RETIN-A) 0.05 % cream, " "APPLY A PEA SIZED AMOUNT TO THE FACE NIGHTLY AS TOLERATED, Disp: , Rfl:       /78 (BP Location: Left arm, Patient Position: Sitting, Cuff Size: Adult)   Pulse 82   Temp 97.8 °F (36.6 °C) (Temporal)   Resp 18   Ht 152.4 cm (60\")   Wt 110 kg (242 lb 8 oz)   SpO2 98%   BMI 47.36 kg/m²     Physical Exam  Constitutional:       General: She is not in acute distress.     Appearance: She is well-developed. She is not diaphoretic.   HENT:      Head: Normocephalic and atraumatic.      Mouth/Throat:      Pharynx: No oropharyngeal exudate.   Eyes:      Conjunctiva/sclera: Conjunctivae normal.      Pupils: Pupils are equal, round, and reactive to light.   Pulmonary:      Effort: Pulmonary effort is normal. No respiratory distress.   Abdominal:      General: There is no distension.      Palpations: Abdomen is soft.   Skin:     General: Skin is warm and dry.      Coloration: Skin is not pale.   Neurological:      Mental Status: She is alert and oriented to person, place, and time.      Cranial Nerves: No cranial nerve deficit.   Psychiatric:         Behavior: Behavior normal.         Thought Content: Thought content normal.           Assessment:   pursuing metabolic and bariatric surgery w/ Dr. Laurent.    ICD-10-CM ICD-9-CM   1. Obesity, Class III, BMI 40-49.9 (morbid obesity) (CMS/HCC)  E66.01 278.01       Discussion/Plan:  During diet appointments the patient is educated on high quality nutrition and habits to facilitate good health and possibly some weight loss. Necessary lifestyle changes and behavior modifications were discussed. Please note, the patient remains compliant in completing her diet requirements.     Goals:   1. Continue to be mindful of healthy food choices and portion control.   2. Increase daily protein intake and reduce carbs.  3. Increase daily exercise/activity as able.   4.  We discussed some other coffee alternatives that don't have as many calories as the [employer] she usually drinks.  5.  " Decreasing eating in between meals.     Follow up in 1 month. Call w/ issues/concerns.

## 2021-06-23 ENCOUNTER — OFFICE VISIT (OUTPATIENT)
Dept: BARIATRICS/WEIGHT MGMT | Facility: CLINIC | Age: 46
End: 2021-06-23

## 2021-06-23 VITALS
HEIGHT: 60 IN | WEIGHT: 242.5 LBS | RESPIRATION RATE: 18 BRPM | BODY MASS INDEX: 47.61 KG/M2 | OXYGEN SATURATION: 98 % | HEART RATE: 117 BPM | TEMPERATURE: 97.8 F | SYSTOLIC BLOOD PRESSURE: 126 MMHG | DIASTOLIC BLOOD PRESSURE: 74 MMHG

## 2021-06-23 DIAGNOSIS — E66.01 OBESITY, CLASS III, BMI 40-49.9 (MORBID OBESITY) (HCC): Primary | ICD-10-CM

## 2021-06-23 PROCEDURE — 99213 OFFICE O/P EST LOW 20 MIN: CPT | Performed by: SURGERY

## 2021-06-23 NOTE — PROGRESS NOTES
"[facility] Bariatric Surgery  [address]  [address]  [facility] [id number] [phone]      Patient Name:  Crissy Grady  :  1975      Date of Visit:  2021      Reason for Visit:  Monthly Diet Visit #3 of 6       HPI:  Presents for monthly supervised diet visit.  Pursuing metabolic and bariatric surgery w/ Dr. Laurent.    Denies any medical issues since last visit.  Focusing on drinking more water, walking more.  This week was more challenging due to stress.    Initial weight: 242 lbs.  Current weight:  242 lbs.    Past Medical History:   Diagnosis Date   • Abnormal Pap smear of cervix    • Chronic back pain     manages w/ NSAIDS, avoids steroids   • Dyspepsia    • Dyspnea on exertion    • Fatigue    • Joint pain     knee \"bone on bone\"   • Morbid obesity (CMS/HCC)    • Plantar fasciitis      Past Surgical History:   Procedure Laterality Date   •  SECTION      twins   • LEEP         No Known Allergies      Current Outpatient Medications:   •  adapalene (DIFFERIN) 0.1 % gel, Apply  topically Every Night., Disp: 45 g, Rfl: 11  •  cyclobenzaprine (FLEXERIL) 10 MG tablet, Take 10 mg by mouth 2 (Two) Times a Day As Needed., Disp: , Rfl:   •  cycloSPORINE (Restasis) 0.05 % ophthalmic emulsion, Administer 1 drop to both eyes Every 12 (Twelve) Hours., Disp: , Rfl:   •  diclofenac (VOLTAREN) 75 MG EC tablet, Take 75 mg by mouth 2 (Two) Times a Day., Disp: , Rfl:   •  fluticasone (FLONASE) 50 MCG/ACT nasal spray, 1 spray into the nostril(s) as directed by provider Daily., Disp: , Rfl:   •  HYDROcodone-acetaminophen (NORCO) 5-325 MG per tablet, Take 1 tablet by mouth Every 6 (Six) Hours As Needed., Disp: , Rfl:   •  magnesium oxide (MAGOX) 400 (241.3 Mg) MG tablet tablet, Take 400 mg by mouth Daily., Disp: , Rfl:   •  meloxicam (MOBIC) 15 MG tablet, TAKE 1 TABLET BY MOUTH ONCE DAILY AS NEEDED FOR PAIN, Disp: , Rfl:   •  methocarbamol (ROBAXIN) 750 MG tablet, Take 750 mg " "by mouth 4 (Four) Times a Day As Needed for Muscle Spasms., Disp: , Rfl:   •  tretinoin (RETIN-A) 0.05 % cream, APPLY A PEA SIZED AMOUNT TO THE FACE NIGHTLY AS TOLERATED, Disp: , Rfl:       /74   Pulse 117   Temp 97.8 °F (36.6 °C) (Infrared)   Resp 18   Ht 152.4 cm (60\")   Wt 110 kg (242 lb 8 oz)   SpO2 98%   BMI 47.36 kg/m²     Physical Exam  Constitutional:       General: She is not in acute distress.     Appearance: She is well-developed. She is not diaphoretic.   HENT:      Head: Normocephalic and atraumatic.      Mouth/Throat:      Pharynx: No oropharyngeal exudate.   Eyes:      Conjunctiva/sclera: Conjunctivae normal.      Pupils: Pupils are equal, round, and reactive to light.   Pulmonary:      Effort: Pulmonary effort is normal. No respiratory distress.   Abdominal:      General: There is no distension.      Palpations: Abdomen is soft.   Skin:     General: Skin is warm and dry.      Coloration: Skin is not pale.   Neurological:      Mental Status: She is alert and oriented to person, place, and time.      Cranial Nerves: No cranial nerve deficit.   Psychiatric:         Behavior: Behavior normal.         Thought Content: Thought content normal.           Assessment:   pursuing metabolic and bariatric surgery w/ Dr. Laurent.    ICD-10-CM ICD-9-CM   1. Obesity, Class III, BMI 40-49.9 (morbid obesity) (CMS/[facility])  E66.01 278.01       Discussion/Plan:  During diet appointments the patient is educated on high quality nutrition and habits to facilitate good health and possibly some weight loss. Necessary lifestyle changes and behavior modifications were discussed. Please note, the patient remains compliant in completing her diet requirements.     Goals:   1. Continue to be mindful of healthy food choices and portion control.   2. Increase daily protein intake and reduce carbs.  3. Increase daily exercise/activity as able.   4.  Prioritize protein at each meal.     Follow up in 1 month. Call w/ " issues/concerns.

## 2021-07-21 ENCOUNTER — OFFICE VISIT (OUTPATIENT)
Dept: PSYCHIATRY | Facility: CLINIC | Age: 46
End: 2021-07-21

## 2021-07-21 DIAGNOSIS — F41.9 ANXIETY: ICD-10-CM

## 2021-07-21 DIAGNOSIS — M54.9 CHRONIC BACK PAIN, UNSPECIFIED BACK LOCATION, UNSPECIFIED BACK PAIN LATERALITY: ICD-10-CM

## 2021-07-21 DIAGNOSIS — Z71.89 ENCOUNTER FOR PSYCHOLOGICAL ASSESSMENT PRIOR TO BARIATRIC SURGERY: ICD-10-CM

## 2021-07-21 DIAGNOSIS — E66.01 MORBID OBESITY (HCC): Primary | ICD-10-CM

## 2021-07-21 DIAGNOSIS — G89.29 CHRONIC BACK PAIN, UNSPECIFIED BACK LOCATION, UNSPECIFIED BACK PAIN LATERALITY: ICD-10-CM

## 2021-07-21 PROCEDURE — 90791 PSYCH DIAGNOSTIC EVALUATION: CPT | Performed by: PSYCHOLOGIST

## 2021-07-22 NOTE — PROGRESS NOTES
PROGRESS NOTE    Data:    Crissy Grady is a 45 y.o. female who met with the undersigned for a scheduled individual outpatient therapy session from 2:31 - 3:15pm.      Clinical Maneuvering/Intervention:      The pt talked about struggling with obesity for several years. Despite trying different weight loss plans and diets, the pt reported being unsuccessful in losing weight. A psychological evaluation was conducted in order to assess past and current level of functioning. Areas assessed included, but were not limited to: perception of social support, perception of ability to face and deal with challenges in life (positive functioning), anxiety symptoms, depresStressors were processed individually and in detail. Venting of frustrations was conducted in order to help the pt feel less tense emotionally and gain insight into issues. Feelings were processed and validated several times in session. sive symptoms, perspective on beliefs/belief system, coping skills for stress, intelligence level, addiction issues, etc. Therapeutic rapport was established. Interventions conducted today were geared towards assessing the pt's readiness for weight loss surgery and identifying and psychological contraindications for undergoing such a major life change. Social support was deemed strong (specific to weight loss surgery/weight loss in this manner and in a general sense): mother, sister, friends. Current psychological struggles were deemed moderate but included stress and anxiety. Coping skills for distress and related to undergoing a major life change such as weight loss surgery/weight loss were deemed adequate and included relying on social support, being thoughtful about major decisions in life,   The pt talked about not being completely ready for weight loss surgery and that she still considers different options other than weight loss.          Mental Status Exam  Hygiene:  good  Dress: normal  Attitude:  cooperative and  proactive  Motor Activity: normal  Speech: normal  Mood:   anxious  Affect:  congruent  Thought Processes: normal  Thought Content:  normal  Suicidal Thoughts:  not endorsed  Homicidal Thoughts:  not endorsed  Crisis Safety Plan: not needed   Hallucinations:  none      Patient's Support Network Includes:  family, friends      Progress toward goal: there is evidence to suggest that she is taking measures to improve the quality of her life including seeking weight loss surgery.       Functional Status: moderate to high      Prognosis: good    Assessment      The pt presented to be struggling with anxiety, obesity, and ambivalence about how she plans to lose weight (despite wanting to lose weight). She seems to believe that some back pain could be alleviated from weight loss. She talked about other options than weight loss surgery that she plans to/wants to look into. She presents as a fairly highly functioning person with many strong coping skills for stress.       From a psychological standpoint, the pt does not present as a good candidate for bariatric surgery. She presents as unsure about which 'path' to take in terms of losing weight, even though she  conveyed that she wants to lose weight soon.    Plan      In order to diminish symptoms of anxiety and improve her quality of life, the pt is to move forward in terms of gathering information about potential healthy routes to take in order to lose weight and make a decision which path to take as soon as possible. Should she decide with more certainty that weight loss surgery is the route that she would like to take, she will undergo a second psychological evaluation. She plans to lose weight in order to help alleviate back pain.     Renee Johansen, PhD, LP

## 2021-11-22 ENCOUNTER — OFFICE VISIT (OUTPATIENT)
Dept: BEHAVIORAL HEALTH | Facility: CLINIC | Age: 46
End: 2021-11-22

## 2021-11-22 ENCOUNTER — DOCUMENTATION (OUTPATIENT)
Dept: BARIATRICS/WEIGHT MGMT | Facility: CLINIC | Age: 46
End: 2021-11-22

## 2021-11-22 DIAGNOSIS — Z71.89 ENCOUNTER FOR PSYCHOLOGICAL ASSESSMENT PRIOR TO BARIATRIC SURGERY: ICD-10-CM

## 2021-11-22 DIAGNOSIS — G89.29 OTHER CHRONIC PAIN: ICD-10-CM

## 2021-11-22 DIAGNOSIS — F41.9 ANXIETY: ICD-10-CM

## 2021-11-22 DIAGNOSIS — E66.01 MORBID OBESITY (HCC): Primary | ICD-10-CM

## 2021-11-22 PROCEDURE — 90834 PSYTX W PT 45 MINUTES: CPT | Performed by: PSYCHOLOGIST

## 2021-11-22 NOTE — PROGRESS NOTES
"Metabolic and Bariatric Surgery  Presurgical Nutrition Assessment     Crissy Grady  2021  [id number]  [id number]  1975  female    Surgery desired: Sleeve Gastrectomy    Wt: 110 kg (242 lb 8 oz)  Ht: 152.4 cm (60\")    Past Medical History:   Diagnosis Date   • Abnormal Pap smear of cervix    • Chronic back pain     manages w/ NSAIDS, avoids steroids   • Dyspepsia    • Dyspnea on exertion    • Fatigue    • Joint pain     knee \"bone on bone\"   • Morbid obesity (CMS/HCC)    • Plantar fasciitis      Past Surgical History:   Procedure Laterality Date   •  SECTION      twins   • LEEP       No Known Allergies    Current Outpatient Medications:   •  adapalene (DIFFERIN) 0.1 % gel, Apply  topically Every Night., Disp: 45 g, Rfl: 11  •  cyclobenzaprine (FLEXERIL) 10 MG tablet, Take 10 mg by mouth 2 (Two) Times a Day As Needed., Disp: , Rfl:   •  cycloSPORINE (Restasis) 0.05 % ophthalmic emulsion, Administer 1 drop to both eyes Every 12 (Twelve) Hours., Disp: , Rfl:   •  diclofenac (VOLTAREN) 75 MG EC tablet, Take 75 mg by mouth 2 (Two) Times a Day., Disp: , Rfl:   •  fluticasone (FLONASE) 50 MCG/ACT nasal spray, 1 spray into the nostril(s) as directed by provider Daily., Disp: , Rfl:   •  HYDROcodone-acetaminophen (NORCO) 5-325 MG per tablet, Take 1 tablet by mouth Every 6 (Six) Hours As Needed., Disp: , Rfl:   •  magnesium oxide (MAGOX) 400 (241.3 Mg) MG tablet tablet, Take 400 mg by mouth Daily., Disp: , Rfl:   •  meloxicam (MOBIC) 15 MG tablet, TAKE 1 TABLET BY MOUTH ONCE DAILY AS NEEDED FOR PAIN, Disp: , Rfl:   •  methocarbamol (ROBAXIN) 750 MG tablet, Take 750 mg by mouth 4 (Four) Times a Day As Needed for Muscle Spasms., Disp: , Rfl:   •  tretinoin (RETIN-A) 0.05 % cream, APPLY A PEA SIZED AMOUNT TO THE FACE NIGHTLY AS TOLERATED, Disp: , Rfl:       Nutrition Assessment    Estimated energy needs:  2000    Estimated calories for weight loss:  1200       IBW (Pounds):  142    Excess body weight " (Pounds): 100       Nutrition Recall  24 Hour recall: (B) (L) (D) -  Reviewed and discussed with patient  Breakfast:  Michael jesus sausage and egg biscuit  Lunch:  Six chicken nuggets  Dinner:  Roast, broccoli   Lemonade, tea, coffee, water    Exercise  rarely      Education    Provided handouts for Sleeve Gastrectomy and reviewed necessary vitamin and protein supplementation for surgery.     Provided follow-up options for support, including contact information for dietitians and access to support groups.     Recommend that team follow per protocol.      Nutrition Goals   Nutrition Guidelines per manual  Protein goal:  grams per day   Eliminate sugar-sweetened beverages    Exercise Goals  Add 15-30 minutes of activity per day as tolerated        Gisella Coats, RAQUEL  11/22/2021  11:25 EST

## 2021-11-22 NOTE — PROGRESS NOTES
PROGRESS NOTE    Data:    Crissy Grady is a 45 y.o. female who met with the undersigned for a scheduled individual outpatient therapy session from 10:30 - 11:15am.      Clinical Maneuvering/Intervention:      The pt talked about struggling with obesity for several years. Despite trying different weight loss plans and diets, the pt reported being unsuccessful in losing weight. A psychological evaluation was conducted in order to assess past and current level of functioning. This is her second evaluation of psychological readiness for weight loss surgery. Areas assessed included, but were not limited to: perception of social support, perception of ability to face and deal with challenges in life (positive functioning), anxiety symptoms, depresStressors were processed individually and in detail. Venting of frustrations was conducted in order to help the pt feel less tense emotionally and gain insight into issues. Feelings were processed and validated several times in session. sive symptoms, perspective on beliefs/belief system, coping skills for stress, intelligence level, addiction issues, etc. Therapeutic rapport was established. Interventions conducted today were geared towards assessing the pt's readiness for weight loss surgery and identifying and psychological contraindications for undergoing such a major life change. Social support was deemed strong (specific to weight loss surgery/weight loss in this manner and in a general sense): mother, sister, friends. Current psychological struggles were deemed moderate but included stress and anxiety. Coping skills for distress and related to undergoing a major life change such as weight loss surgery/weight loss were deemed adequate and included relying on social support, being thoughtful about major decisions in life, and having strong ross in God. There were some worries on her behalf about surgery, but those worries dissipated over the course of the visit. She talked  about additional research, thinking, and readiness she has experienced since the previous evaluation on 07/21/21. She talked about how she is choosing the best path for herself in order to lose weight, alleviate physical pain, and improve her quality of life overall.         Mental Status Exam  Hygiene:  good  Dress: normal  Attitude:  cooperative and proactive  Motor Activity: normal  Speech: normal  Mood:   somewhat down  Affect:  congruent  Thought Processes: normal  Thought Content:  normal  Suicidal Thoughts:  not endorsed  Homicidal Thoughts:  not endorsed  Crisis Safety Plan: not needed   Hallucinations:  none      Patient's Support Network Includes:  family, friends      Progress toward goal: there is evidence to suggest that she is taking measures to improve the quality of her life including seeking weight loss surgery.       Functional Status: moderate to high      Prognosis: good    Assessment      The pt presented to be struggling with anxiety, chronic pain (back, knees, hip) and frustrations with obesity (BMI 47.4). She does have several strong coping skills for stress, however, as delineated above. She has shown progress in readiness for weight loss surgery as she could describe getting to his point and no longer questioning her decision to have it or not have it.       From a psychological standpoint, the pt presents as a good candidate for bariatric surgery. She is motivated for the surgery, has showed readiness for the lifestyle change in terms of adjusting her eating habits, and seems to have appropriate expectations of how to prepare and how to live after surgery in order to lose weight successfully..    Plan      In order to diminish symptoms of anxiety and potentially alleviate physical pain (back, knees, hip), she will continue taking control over her health and start losing weight. She will follow up with her weight loss surgeon in order to have weigh loss surgery (as soon as feasible) and  continue improving healthier eating habits.    Renee Johansen, PhD, LP

## 2021-11-24 DIAGNOSIS — R53.83 FATIGUE, UNSPECIFIED TYPE: ICD-10-CM

## 2021-11-24 DIAGNOSIS — R06.00 DYSPNEA, UNSPECIFIED TYPE: Primary | ICD-10-CM

## 2022-02-08 DIAGNOSIS — R06.00 DYSPNEA, UNSPECIFIED TYPE: Primary | ICD-10-CM

## 2022-02-08 DIAGNOSIS — R53.83 FATIGUE, UNSPECIFIED TYPE: ICD-10-CM

## 2022-03-04 ENCOUNTER — LAB (OUTPATIENT)
Dept: LAB | Facility: HOSPITAL | Age: 47
End: 2022-03-04

## 2022-03-04 ENCOUNTER — HOSPITAL ENCOUNTER (OUTPATIENT)
Dept: GENERAL RADIOLOGY | Facility: HOSPITAL | Age: 47
Discharge: HOME | End: 2022-03-04

## 2022-03-04 ENCOUNTER — HOSPITAL ENCOUNTER (OUTPATIENT)
Dept: CARDIOLOGY | Facility: HOSPITAL | Age: 47
Discharge: HOME | End: 2022-03-04

## 2022-03-04 ENCOUNTER — TRANSCRIBE ORDERS (OUTPATIENT)
Dept: ADMINISTRATIVE | Facility: HOSPITAL | Age: 47
End: 2022-03-04

## 2022-03-04 DIAGNOSIS — Z01.818 PRE-OP TESTING: ICD-10-CM

## 2022-03-04 DIAGNOSIS — R53.83 FATIGUE, UNSPECIFIED TYPE: ICD-10-CM

## 2022-03-04 DIAGNOSIS — Z01.818 PRE-OP TESTING: Primary | ICD-10-CM

## 2022-03-04 DIAGNOSIS — R06.00 DYSPNEA, UNSPECIFIED TYPE: ICD-10-CM

## 2022-03-04 LAB
ALBUMIN SERPL-MCNC: 4.3 G/DL (ref 3.5–5.2)
ALBUMIN/GLOB SERPL: 1.4 G/DL
ALP SERPL-CCNC: 96 U/L (ref 39–117)
ALT SERPL W P-5'-P-CCNC: 14 U/L (ref 1–33)
ANION GAP SERPL CALCULATED.3IONS-SCNC: 9 MMOL/L (ref 5–15)
AST SERPL-CCNC: 11 U/L (ref 1–32)
BILIRUB SERPL-MCNC: 0.3 MG/DL (ref 0–1.2)
BUN SERPL-MCNC: 13 MG/DL (ref 6–20)
BUN/CREAT SERPL: 18.3 (ref 7–25)
CALCIUM SPEC-SCNC: 9.7 MG/DL (ref 8.6–10.5)
CHLORIDE SERPL-SCNC: 106 MMOL/L (ref 98–107)
CO2 SERPL-SCNC: 26 MMOL/L (ref 22–29)
CREAT SERPL-MCNC: 0.71 MG/DL (ref 0.57–1)
DEPRECATED RDW RBC AUTO: 44.6 FL (ref 37–54)
EGFRCR SERPLBLD CKD-EPI 2021: 106.3 ML/MIN/1.73
ERYTHROCYTE [DISTWIDTH] IN BLOOD BY AUTOMATED COUNT: 14.8 % (ref 12.3–15.4)
GLOBULIN UR ELPH-MCNC: 3 GM/DL
GLUCOSE SERPL-MCNC: 87 MG/DL (ref 65–99)
HCT VFR BLD AUTO: 36.6 % (ref 34–46.6)
HGB BLD-MCNC: 11.2 G/DL (ref 12–15.9)
MCH RBC QN AUTO: 25.1 PG (ref 26.6–33)
MCHC RBC AUTO-ENTMCNC: 30.6 G/DL (ref 31.5–35.7)
MCV RBC AUTO: 82.1 FL (ref 79–97)
PLATELET # BLD AUTO: 254 10*3/MM3 (ref 140–450)
PMV BLD AUTO: 10.3 FL (ref 6–12)
POTASSIUM SERPL-SCNC: 4.2 MMOL/L (ref 3.5–5.2)
PROT SERPL-MCNC: 7.3 G/DL (ref 6–8.5)
QT INTERVAL: 372 MS
RBC # BLD AUTO: 4.46 10*6/MM3 (ref 3.77–5.28)
SODIUM SERPL-SCNC: 141 MMOL/L (ref 136–145)
WBC NRBC COR # BLD: 8.12 10*3/MM3 (ref 3.4–10.8)

## 2022-03-04 PROCEDURE — 80053 COMPREHEN METABOLIC PANEL: CPT

## 2022-03-04 PROCEDURE — 93010 ELECTROCARDIOGRAM REPORT: CPT | Performed by: INTERNAL MEDICINE

## 2022-03-04 PROCEDURE — 36415 COLL VENOUS BLD VENIPUNCTURE: CPT

## 2022-03-04 PROCEDURE — 93005 ELECTROCARDIOGRAM TRACING: CPT | Performed by: PHYSICIAN ASSISTANT

## 2022-03-04 PROCEDURE — 71046 X-RAY EXAM CHEST 2 VIEWS: CPT

## 2022-03-04 PROCEDURE — 85027 COMPLETE CBC AUTOMATED: CPT

## 2022-03-08 ENCOUNTER — CONSULT (OUTPATIENT)
Dept: BARIATRICS/WEIGHT MGMT | Facility: CLINIC | Age: 47
End: 2022-03-08

## 2022-03-08 VITALS
TEMPERATURE: 97.5 F | SYSTOLIC BLOOD PRESSURE: 128 MMHG | OXYGEN SATURATION: 98 % | DIASTOLIC BLOOD PRESSURE: 84 MMHG | RESPIRATION RATE: 18 BRPM | HEIGHT: 60 IN | BODY MASS INDEX: 46.43 KG/M2 | WEIGHT: 236.5 LBS | HEART RATE: 83 BPM

## 2022-03-08 DIAGNOSIS — E66.01 MORBID OBESITY WITH BODY MASS INDEX OF 45.0-49.9 IN ADULT: Primary | ICD-10-CM

## 2022-03-08 PROCEDURE — 99214 OFFICE O/P EST MOD 30 MIN: CPT | Performed by: SURGERY

## 2022-03-08 RX ORDER — PANTOPRAZOLE SODIUM 40 MG/10ML
40 INJECTION, POWDER, LYOPHILIZED, FOR SOLUTION INTRAVENOUS ONCE
Status: CANCELLED | OUTPATIENT
Start: 2022-03-08 | End: 2022-03-08

## 2022-03-08 RX ORDER — BENZONATATE 100 MG/1
100 CAPSULE ORAL
COMMUNITY
Start: 2022-02-20 | End: 2022-06-07

## 2022-03-08 RX ORDER — SODIUM CHLORIDE 0.9 % (FLUSH) 0.9 %
3 SYRINGE (ML) INJECTION EVERY 12 HOURS SCHEDULED
Status: CANCELLED | OUTPATIENT
Start: 2022-03-08

## 2022-03-08 RX ORDER — DIMENHYDRINATE 50 MG
TABLET ORAL
COMMUNITY

## 2022-03-08 RX ORDER — CHLORHEXIDINE GLUCONATE 0.12 MG/ML
30 RINSE ORAL
Status: CANCELLED | OUTPATIENT
Start: 2022-03-08 | End: 2022-03-08

## 2022-03-08 RX ORDER — SODIUM CHLORIDE, SODIUM LACTATE, POTASSIUM CHLORIDE, CALCIUM CHLORIDE 600; 310; 30; 20 MG/100ML; MG/100ML; MG/100ML; MG/100ML
150 INJECTION, SOLUTION INTRAVENOUS CONTINUOUS
Status: CANCELLED | OUTPATIENT
Start: 2022-03-08

## 2022-03-08 RX ORDER — SODIUM CHLORIDE 0.9 % (FLUSH) 0.9 %
3-10 SYRINGE (ML) INJECTION AS NEEDED
Status: CANCELLED | OUTPATIENT
Start: 2022-03-08

## 2022-03-08 RX ORDER — ACETAMINOPHEN 500 MG
1000 TABLET ORAL ONCE
Status: CANCELLED | OUTPATIENT
Start: 2022-03-08 | End: 2022-03-08

## 2022-03-08 RX ORDER — GABAPENTIN 100 MG/1
600 CAPSULE ORAL ONCE
Status: CANCELLED | OUTPATIENT
Start: 2022-03-08 | End: 2022-03-08

## 2022-03-08 RX ORDER — SCOLOPAMINE TRANSDERMAL SYSTEM 1 MG/1
1 PATCH, EXTENDED RELEASE TRANSDERMAL ONCE
Status: CANCELLED | OUTPATIENT
Start: 2022-03-08 | End: 2022-03-08

## 2022-04-02 NOTE — PROGRESS NOTES
[facility]  [address]  [address]  [place], located within [facility] [address]  [phone]      Patient  Name:  Crissy Grady  :  1975      Date of Visit: 3/8/22    Chief Complaint:  weight gain; unable to maintain weight loss.   Evaluate for possible metabolic and bariatric surgery    History of Present Illness:  Crissy Grady is a 46 y.o. female who presents today for evaluation, education and consultation regarding metabolic and bariatric surgery (MBS).  Since last seen 2021 she has lost 6 pounds. The patient returns for final visit prior to metabolic and bariatric surgery specifically the sleeve gastrectomy.  Original intake evaluation Susie Spears PA-C dated 3/30/202 (roughly a year ago) reviewed.  She noted at that time the patient's maximum lifetime weight was her current weight of 242 pounds.     The patient has had issues with morbid obesity for years and only temporary success with non-surgical methods of weight loss.  The patient is seeking LSG to help with the morbid obesity related conditions of chronic back pain, dyspnea exertion, fatigue, joint pain, plantar fasciitis, microcytic anemia.    46-year-old morbidly obese female from [facility].  Currently on amoxicillin for bronchitis.  She says she has several friends who had surgery with me and that she previously lived in [place] and has been in [place] the last 3 years.  She says she does not take narcotics regularly.  She denies personal or family history of bleeding or clotting disorders.  She wrote down that she will need to avoid anti-inflammatories 1 week prior in 6 weeks after surgery to minimize the risk of delayed leak.  She says she has rare reflux symptoms except when she was pregnant.  She denies any gallbladder symptoms.  She does get occasional pains in her lower quadrant after eating and thinks it is her sciatica.  She says she does have a lot of gas and passes a lot of flatus since  "her pregnancy.  I offered her a gallbladder ultrasound and she declined.      Past Medical History:   Diagnosis Date   • Abnormal Pap smear of cervix    • Chronic back pain     manages w/ NSAIDS, avoids steroids   • Dyspepsia    • Dyspnea on exertion    • Fatigue    • Joint pain     knee \"bone on bone\"   • Microcytic anemia    • Morbid obesity (HCC)    • Plantar fasciitis      Past Surgical History:   Procedure Laterality Date   •  SECTION  2013    twins   • LEEP         No Known Allergies    Current Outpatient Medications:   •  adapalene (DIFFERIN) 0.1 % gel, Apply  topically Every Night., Disp: 45 g, Rfl: 11  •  benzonatate (TESSALON) 100 MG capsule, Take 100 mg by mouth., Disp: , Rfl:   •  cyclobenzaprine (FLEXERIL) 10 MG tablet, Take 10 mg by mouth 2 (Two) Times a Day As Needed., Disp: , Rfl:   •  cycloSPORINE (Restasis) 0.05 % ophthalmic emulsion, Administer 1 drop to both eyes Every 12 (Twelve) Hours., Disp: , Rfl:   •  diclofenac (VOLTAREN) 75 MG EC tablet, Take 75 mg by mouth 2 (Two) Times a Day., Disp: , Rfl:   •  fluticasone (FLONASE) 50 MCG/ACT nasal spray, 1 spray into the nostril(s) as directed by provider Daily., Disp: , Rfl:   •  magnesium oxide (MAGOX) 400 (241.3 Mg) MG tablet tablet, Take 400 mg by mouth Daily., Disp: , Rfl:   •  meloxicam (MOBIC) 15 MG tablet, TAKE 1 TABLET BY MOUTH ONCE DAILY AS NEEDED FOR PAIN, Disp: , Rfl:   •  Flaxseed, Linseed, (Flax Seed Oil) 1000 MG capsule, Take  by mouth., Disp: , Rfl:   •  HYDROcodone-acetaminophen (NORCO) 5-325 MG per tablet, Take 1 tablet by mouth Every 6 (Six) Hours As Needed., Disp: , Rfl:     Social History     Socioeconomic History   • Marital status: Single   Tobacco Use   • Smoking status: Never Smoker   • Smokeless tobacco: Never Used   Substance and Sexual Activity   • Alcohol use: No   • Drug use: No   • Sexual activity: Defer     Birth control/protection: None     Family History   Problem Relation Age of Onset   • Hypertension " Mother    • Hypertension Father    • Sleep apnea Father    • Cancer Paternal Grandfather    • Diabetes Maternal Grandmother        Review of Systems   Constitutional: Positive for fatigue. Negative for chills, diaphoresis, fever and unexpected weight loss.   HENT: Negative for congestion and facial swelling.    Eyes: Negative for blurred vision, double vision and discharge.   Respiratory: Negative for chest tightness, shortness of breath and stridor.    Cardiovascular: Negative for chest pain, palpitations and leg swelling.   Gastrointestinal: Negative for blood in stool.   Endocrine: Negative for polydipsia.   Genitourinary: Negative for hematuria.   Musculoskeletal: Positive for arthralgias and back pain.   Skin: Negative for color change.   Allergic/Immunologic: Negative for immunocompromised state.   Neurological: Negative for confusion.   Psychiatric/Behavioral: Negative for self-injury.       I have reviewed the ROS and confirm that it's accurate today.    Physical Exam:  Vital Signs:  Weight: 107 kg (236 lb 8 oz)   Body mass index is 46.19 kg/m².  Temp: 97.5 °F (36.4 °C)   Heart Rate: 83   BP: 128/84     Physical Exam  Vitals reviewed.   Constitutional:       Appearance: She is well-developed.   HENT:      Head: Normocephalic and atraumatic.      Nose:      Comments: mask  Eyes:      Conjunctiva/sclera: Conjunctivae normal.      Pupils: Pupils are equal, round, and reactive to light.   Neck:      Thyroid: No thyromegaly.      Vascular: No carotid bruit.      Trachea: No tracheal deviation.   Cardiovascular:      Rate and Rhythm: Normal rate and regular rhythm.      Heart sounds: Normal heart sounds.   Pulmonary:      Effort: Pulmonary effort is normal. No respiratory distress.      Breath sounds: Normal breath sounds.   Abdominal:      General: There is no distension.      Palpations: Abdomen is soft.      Tenderness: There is no abdominal tenderness.      Comments: Low transverse scar   Musculoskeletal:          General: No deformity. Normal range of motion.      Cervical back: Normal range of motion and neck supple.   Skin:     General: Skin is warm and dry.      Findings: No rash.   Neurological:      Mental Status: She is alert and oriented to person, place, and time.      Cranial Nerves: No cranial nerve deficit.      Coordination: Coordination normal.   Psychiatric:         Behavior: Behavior normal.         Thought Content: Thought content normal.         Judgment: Judgment normal.         Patient Active Problem List   Diagnosis   • Acanthosis nigricans   • Acne vulgaris   • Chronic back pain   • Fatigue   • Dyspepsia   • Dyspnea on exertion   • Morbid obesity (HCC)   • Plantar fasciitis   • Joint pain   • Morbid obesity with body mass index (BMI) of 45.0 to 49.9 in adult ([facility])       Assessment:    Crissy Grady is a 46 y.o. year old female with medically complicated obesity.    Metabolic and bariatric surgery is deemed medically necessary given the following obesity related comorbidities including chronic back pain, dyspnea exertion, fatigue, joint pain, plantar fasciitis, microcytic anemia with current Weight: 107 kg (236 lb 8 oz) and Body mass index is 46.19 kg/m²..    Encounter Diagnosis   Name Primary?   • Morbid obesity with body mass index of 45.0-49.9 in adult ([facility]) Yes      Patient is aware that surgery is a tool, and that weight loss and improvement in comorbidities is not guaranteed but only seen in the context of appropriate use, follow up and physical activity.    The patient was present for an approximately a 2.5 hour discussion of the purpose of MBS, how MBS is a tool to assist in achieving weight loss goals, the most common complications and how best to avoid them, and the strategies for short and long term weight loss and improvement in comorbidities.  Ample opportunity to discuss questions was available both in group and during the time of individual examination.    I reviewed her Kiet report  "which is negative.  Labs dated 3/4/2022 showing an unremarkable CBC except for hemoglobin of 11.2 and decreased MCH and MCHC normal CMP.  Chest x-ray dated 3/4/2022 showing borderline cardiomegaly no active process.  EKG dated 3/4/2022 normal sinus rhythm with PVCs confirmed by Cristopher Medina.  Psychosocial evaluation dated 11/22/2021 Renee Johansen, PhD good candidate.  Dietitian evaluation dated 11/22/2021 Gisella Coats RD.  Negative H. pylori breath test dated 4/6/2021.  Normal TSH.  Labs dated 3/30/2021 normal CMP normal hemoglobin A1c at 5.6 of note it was elevated at 5.7 on 6/22/2018.  Normal lipid panel.  Please see scanned records that I have reviewed and signed off on today.  All of this in addition to the patient's unique history and exam has been taken into consideration in determining their appropriate candidacy for MBS.    Complications  of laparoscopic/possible robotic gastric sleeve were discussed. The patient is well aware of the potential complications of surgery that include but not limited to bleeding, infections, deep venous thrombosis, pulmonary embolism, pulmonary complications such as pneumonia, cardiac events, hernias, small bowel obstruction, damage to the spleen or other organs, bowel injury, disfiguring scars, failure to lose weight, need for additional surgery, conversion to an open procedure, and death. Patient is also aware of complications which apply in this particular procedure that can include but are not limited to a \"leak\" at the staple line which in some instances may require conversion to gastric bypass.    The patient is aware if a hiatal hernia is encountered, it likely will be repaired.  R/B/A Rx to hiatal hernia repair were discussed as outlined in our long consent form.  Briefly risks in addition to those for LSG include recurrent hernia, DONTRELL, dysphagia, esophageal injury, pneumothorax, injury to the vagus nerves, injury to the thoracic duct, aorta or vena cava.    I discussed " avoiding all tobacco products, nicotine,  and second hand smoke at least 2 weeks pre-operatively and 6 weeks post-operatively to minimize the risk of sleeve leak.  This included discussing the importance of avoiding even secondhand smoke as the risk of leak is increased.  Examples discussed:  Avoid going in a house or riding in a car where someone has previously smoked in the last 2 weeks and for 6 weeks postoperatively.  Avoid living in a house where someone smokes (even if it's in a separate room/patio/attached garage, etc.).   Avoid congregating with a group of people who are smoking even if it's outside.  It is OK to be around wood burning fires and barbecue.  I explained that I do not know if marijuana has a same effects but my overall recommendation is to avoid it for 2 weeks prior in 6 weeks after surgery.     Discussed the risks, benefits and alternative therapies at great length as outlined in our extensive consent forms, consent videos, and educational teaching process under the direction of the center's .    A copy of the patient's signed informed consent is on file.    R/B/A Rx discussed to postop anticoagulation incl but not limited to bleeding, drug reaction, venothromboembolic events, etc. and the patient declined.        Plan: After evaluation today I think the patient is a reasonable candidate for laparoscopic sleeve gastrectomy and EGD.  Type and screen.  Other issues include chronic back pain, dyspnea exertion, fatigue, joint pain, plantar fasciitis, microcytic anemia.    Thank you Dr. Vergara for the opportunity evaluate Ms. Grady.        Son Laruent MD

## 2022-06-07 ENCOUNTER — OFFICE VISIT (OUTPATIENT)
Dept: BARIATRICS/WEIGHT MGMT | Facility: CLINIC | Age: 47
End: 2022-06-07

## 2022-06-07 VITALS
RESPIRATION RATE: 16 BRPM | SYSTOLIC BLOOD PRESSURE: 128 MMHG | BODY MASS INDEX: 46.63 KG/M2 | HEIGHT: 60 IN | DIASTOLIC BLOOD PRESSURE: 86 MMHG | TEMPERATURE: 97.8 F | HEART RATE: 78 BPM | WEIGHT: 237.5 LBS | OXYGEN SATURATION: 99 %

## 2022-06-07 DIAGNOSIS — E66.01 MORBID OBESITY WITH BODY MASS INDEX OF 45.0-49.9 IN ADULT: Primary | ICD-10-CM

## 2022-06-07 PROCEDURE — 99214 OFFICE O/P EST MOD 30 MIN: CPT | Performed by: PHYSICIAN ASSISTANT

## 2022-06-07 NOTE — PROGRESS NOTES
"[facility] BARIATRIC SURGERY  [address]  [address]  [facility] [id number] [phone]      Patient  Name:  Crissy Grady  :  1975      Date of Visit: 2022      Chief Complaint:  weight gain; unable to maintain weight loss.        History of Present Illness:  Crissy Grady is a 46 y.o. female pursuing MBS w/ Dr. Laurent.     From Consult w/ Dr. Laurent 3/8/22:  \"The patient returns for final visit prior to metabolic and bariatric surgery specifically the sleeve gastrectomy.  Original intake evaluation Susie Pringle PA-C dated 3/30/2021 (roughly a year ago) reviewed.  She noted at that time the patient's maximum lifetime weight was her current weight of 242 pounds.    The patient has had issues with morbid obesity for years and only temporary success with non-surgical methods of weight loss.  The patient is seeking LSG to help with the morbid obesity related conditions of chronic back pain, dyspnea exertion, fatigue, joint pain, plantar fasciitis, microcytic anemia.    46-year-old morbidly obese female from [facility] [place].  Currently on amoxicillin for bronchitis.  She says she has several friends who had surgery with me and that she previously lived in [place] and has been in [place] the last 3 years.  She says she does not take narcotics regularly.  She denies personal or family history of bleeding or clotting disorders.  She wrote down that she will need to avoid anti-inflammatories 1 week prior in 6 weeks after surgery to minimize the risk of delayed leak.  She says she has rare reflux symptoms except when she was pregnant.  She denies any gallbladder symptoms.  She does get occasional pains in her lower quadrant after eating and thinks it is her sciatica.  She says she does have a lot of gas and passes a lot of flatus since her pregnancy.  I offered her a gallbladder ultrasound and she declined.\"    ---- No new issues/concerns.  Scheduled for surgery next week, " "but says has to travel out of town the weekend after, thinks she may need to reschedule.  Discussed importance of frequent ambulation x 6 wks postop.  Had confusion about preop diet, eating more fruits and vegetables than advised while trying to avoid sugars/sweets otherwise.  Has been taking Mobic, but made herself a note to avoid x 1 week prior to surgery.  Otherwise avoiding ASA/steroids/tobacco.      Past Medical History:   Diagnosis Date   • Abnormal Pap smear of cervix    • Chronic back pain     manages w/ NSAIDS, avoids steroids   • Dyspepsia    • Dyspnea on exertion    • Fatigue    • Joint pain     knee \"bone on bone\"   • Microcytic anemia    • Morbid obesity (HCC)    • Plantar fasciitis      Past Surgical History:   Procedure Laterality Date   •  SECTION  2013    twins   • LEEP         No Known Allergies    Current Outpatient Medications:   •  adapalene (DIFFERIN) 0.1 % gel, Apply  topically Every Night., Disp: 45 g, Rfl: 11  •  cyclobenzaprine (FLEXERIL) 10 MG tablet, Take 10 mg by mouth 2 (Two) Times a Day As Needed., Disp: , Rfl:   •  cycloSPORINE (Restasis) 0.05 % ophthalmic emulsion, Administer 1 drop to both eyes Every 12 (Twelve) Hours., Disp: , Rfl:   •  Flaxseed, Linseed, (Flax Seed Oil) 1000 MG capsule, Take  by mouth., Disp: , Rfl:   •  HYDROcodone-acetaminophen (NORCO) 5-325 MG per tablet, Take 1 tablet by mouth Every 6 (Six) Hours As Needed., Disp: , Rfl:   •  magnesium oxide (MAGOX) 400 (241.3 Mg) MG tablet tablet, Take 400 mg by mouth Daily., Disp: , Rfl:   •  meloxicam (MOBIC) 15 MG tablet, TAKE 1 TABLET BY MOUTH ONCE DAILY AS NEEDED FOR PAIN, Disp: , Rfl:   •  fluticasone (FLONASE) 50 MCG/ACT nasal spray, 1 spray into the nostril(s) as directed by provider Daily., Disp: , Rfl:     Social History     Socioeconomic History   • Marital status: Single   Tobacco Use   • Smoking status: Never Smoker   • Smokeless tobacco: Never Used   Substance and Sexual Activity   • Alcohol use: No "   • Drug use: No   • Sexual activity: Defer     Birth control/protection: None     Family History   Problem Relation Age of Onset   • Hypertension Mother    • Hypertension Father    • Sleep apnea Father    • Cancer Paternal Grandfather    • Diabetes Maternal Grandmother        Review of Systems   Constitutional: Positive for fatigue. Negative for chills, diaphoresis, fever and unexpected weight loss.   HENT: Negative for congestion and facial swelling.    Eyes: Negative for blurred vision, double vision and discharge.   Respiratory: Negative for chest tightness, shortness of breath and stridor.    Cardiovascular: Negative for chest pain, palpitations and leg swelling.   Gastrointestinal: Negative for blood in stool.   Endocrine: Negative for polydipsia.   Genitourinary: Negative for hematuria.   Musculoskeletal: Positive for arthralgias and back pain.   Skin: Negative for color change.   Allergic/Immunologic: Negative for immunocompromised state.   Neurological: Negative for confusion.   Psychiatric/Behavioral: Negative for self-injury.       ROS reviewed - accurate.     Physical Exam:  Vital Signs:  Weight: 108 kg (237 lb 8 oz)   Body mass index is 46.38 kg/m².  Temp: 97.8 °F (36.6 °C)   Heart Rate: 78   BP: 128/86     From Consult w/ Dr. Laurent 3/8/22:   Physical Exam  Vitals reviewed.   Constitutional:       Appearance: She is well-developed.   HENT:      Head: Normocephalic and atraumatic.      Nose:      Comments: mask  Eyes:      Conjunctiva/sclera: Conjunctivae normal.      Pupils: Pupils are equal, round, and reactive to light.   Neck:      Thyroid: No thyromegaly.      Vascular: No carotid bruit.      Trachea: No tracheal deviation.   Cardiovascular:      Rate and Rhythm: Normal rate and regular rhythm.      Heart sounds: Normal heart sounds.   Pulmonary:      Effort: Pulmonary effort is normal. No respiratory distress.      Breath sounds: Normal breath sounds.   Abdominal:      General: There is no  "distension.      Palpations: Abdomen is soft.      Tenderness: There is no abdominal tenderness.      Comments: Low transverse scar   Musculoskeletal:         General: No deformity. Normal range of motion.      Cervical back: Normal range of motion and neck supple.   Skin:     General: Skin is warm and dry.      Findings: No rash.   Neurological:      Mental Status: She is alert and oriented to person, place, and time.      Cranial Nerves: No cranial nerve deficit.      Coordination: Coordination normal.   Psychiatric:         Behavior: Behavior normal.         Thought Content: Thought content normal.         Judgment: Judgment normal.         Patient Active Problem List   Diagnosis   • Acanthosis nigricans   • Acne vulgaris   • Chronic back pain   • Fatigue   • Dyspepsia   • Dyspnea on exertion   • Morbid obesity (HCC)   • Plantar fasciitis   • Joint pain   • Morbid obesity with body mass index (BMI) of 45.0 to 49.9 in adult (HCC)       Assessment:  Crissy Grady is a 46 y.o. female with medically complicated obesity.    From Consult w/ Dr. Laurent 3/8/22:  \"Metabolic and bariatric surgery is deemed medically necessary given the following obesity related comorbidities including chronic back pain, dyspnea exertion, fatigue, joint pain, plantar fasciitis, microcytic anemia with current Weight: 108 kg (237 lb 8 oz) and Body mass index is 46.38 kg/m².     Encounter Diagnosis   Name Primary?   • Morbid obesity with body mass index of 45.0-49.9 in adult (HCC) Yes      Patient is aware that surgery is a tool, and that weight loss and improvement in comorbidities is not guaranteed but only seen in the context of appropriate use, follow up and physical activity.    The patient was present for an approximately a 2.5 hour discussion of the purpose of MBS, how MBS is a tool to assist in achieving weight loss goals, the most common complications and how best to avoid them, and the strategies for short and long term weight loss " "and improvement in comorbidities.  Ample opportunity to discuss questions was available both in group and during the time of individual examination.    I reviewed her Kiet report which is negative.  Labs dated 3/4/2022 showing an unremarkable CBC except for hemoglobin of 11.2 and decreased MCH and MCHC normal CMP.  Chest x-ray dated 3/4/2022 showing borderline cardiomegaly no active process.  EKG dated 3/4/2022 normal sinus rhythm with PVCs confirmed by Cristopher Medina.  Psychosocial evaluation dated 11/22/2021 Renee Johansen, PhD good candidate.  Dietitian evaluation dated 11/22/2021 Gisella Coats RD.  Negative H. pylori breath test dated 4/6/2021.  Normal TSH.  Labs dated 3/30/2021 normal CMP normal hemoglobin A1c at 5.6 of note it was elevated at 5.7 on 6/22/2018.  Normal lipid panel.  Please see scanned records that I have reviewed and signed off on today.  All of this in addition to the patient's unique history and exam has been taken into consideration in determining their appropriate candidacy for MBS.    Complications  of laparoscopic/possible robotic gastric sleeve were discussed. The patient is well aware of the potential complications of surgery that include but not limited to bleeding, infections, deep venous thrombosis, pulmonary embolism, pulmonary complications such as pneumonia, cardiac events, hernias, small bowel obstruction, damage to the spleen or other organs, bowel injury, disfiguring scars, failure to lose weight, need for additional surgery, conversion to an open procedure, and death. Patient is also aware of complications which apply in this particular procedure that can include but are not limited to a \"leak\" at the staple line which in some instances may require conversion to gastric bypass.    The patient is aware if a hiatal hernia is encountered, it likely will be repaired.  R/B/A Rx to hiatal hernia repair were discussed as outlined in our long consent form.  Briefly risks in addition to " "those for LSG include recurrent hernia, DONTRELL, dysphagia, esophageal injury, pneumothorax, injury to the vagus nerves, injury to the thoracic duct, aorta or vena cava.    I discussed avoiding all tobacco products, nicotine,  and second hand smoke at least 2 weeks pre-operatively and 6 weeks post-operatively to minimize the risk of sleeve leak.  This included discussing the importance of avoiding even secondhand smoke as the risk of leak is increased.  Examples discussed:  Avoid going in a house or riding in a car where someone has previously smoked in the last 2 weeks and for 6 weeks postoperatively.  Avoid living in a house where someone smokes (even if it's in a separate room/patio/attached garage, etc.).   Avoid congregating with a group of people who are smoking even if it's outside.  It is OK to be around wood burning fires and barbecue.  I explained that I do not know if marijuana has a same effects but my overall recommendation is to avoid it for 2 weeks prior in 6 weeks after surgery.     Discussed the risks, benefits and alternative therapies at great length as outlined in our extensive consent forms, consent videos, and educational teaching process under the direction of the center's .    A copy of the patient's signed informed consent is on file.    R/B/A Rx discussed to postop anticoagulation incl but not limited to bleeding, drug reaction, venothromboembolic events, etc. and the patient declined.      Plan: After evaluation today I think the patient is a reasonable candidate for laparoscopic sleeve gastrectomy and EGD.  Type and screen.  Other issues include chronic back pain, dyspnea exertion, fatigue, joint pain, plantar fasciitis, microcytic anemia.\"      ---- As above, will proceed w/ laparoscopic sleeve gastrectomy and EGD @[facility] w/ Dr. Laurent.  Preop/Postop instructions reviewed - patient took written notes.  All questions answered.  Call w/ issues/concerns.                "

## 2022-06-13 ENCOUNTER — APPOINTMENT (OUTPATIENT)
Dept: PREADMISSION TESTING | Facility: HOSPITAL | Age: 47
End: 2022-06-13

## 2023-02-01 ENCOUNTER — OFFICE VISIT (OUTPATIENT)
Dept: ORTHOPEDIC SURGERY | Facility: CLINIC | Age: 48
End: 2023-02-01
Payer: COMMERCIAL

## 2023-02-01 VITALS — HEIGHT: 60 IN | WEIGHT: 240 LBS | BODY MASS INDEX: 47.12 KG/M2

## 2023-02-01 DIAGNOSIS — M23.91 LOCKING OF RIGHT KNEE: Primary | ICD-10-CM

## 2023-02-01 PROCEDURE — 99243 OFF/OP CNSLTJ NEW/EST LOW 30: CPT | Performed by: ORTHOPAEDIC SURGERY

## 2023-02-01 NOTE — PROGRESS NOTES
Patient Name: Crissy Grady   YOB: 1975  Referring Primary Care Physician: Kirt Vergara MD  BMI: Body mass index is 46.87 kg/m².    Chief Complaint:    Chief Complaint   Patient presents with   • Right Knee - Initial Evaluation        HPI:     Crissy Grady is a 47 y.o. female who presents today for evaluation of   Chief Complaint   Patient presents with   • Right Knee - Initial Evaluation   .  Patient is seen today complaining of right knee pain.  Says been going on better than 2 years.  She says she has been told in the past she had a meniscal tear but cannot member where she had the MRI etc.  Her knee bothers her a lot when she twists or turns it feels like something sticks her and like it is catching.  Its intermittent.      Subjective   Medications:   Home Medications:  Current Outpatient Medications on File Prior to Visit   Medication Sig   • adapalene (DIFFERIN) 0.1 % gel Apply  topically Every Night.   • cyclobenzaprine (FLEXERIL) 10 MG tablet Take 10 mg by mouth 2 (Two) Times a Day As Needed.   • cycloSPORINE (Restasis) 0.05 % ophthalmic emulsion Administer 1 drop to both eyes Every 12 (Twelve) Hours.   • Flaxseed, Linseed, (Flax Seed Oil) 1000 MG capsule Take  by mouth.   • HYDROcodone-acetaminophen (NORCO) 5-325 MG per tablet Take 1 tablet by mouth Every 6 (Six) Hours As Needed.   • magnesium oxide (MAGOX) 400 (241.3 Mg) MG tablet tablet Take 400 mg by mouth Daily.   • meloxicam (MOBIC) 15 MG tablet TAKE 1 TABLET BY MOUTH ONCE DAILY AS NEEDED FOR PAIN   • naproxen (NAPROSYN) 500 MG tablet Take 1 tablet by mouth 2 (Two) Times a Day With Meals for 10 days.   • fluticasone (FLONASE) 50 MCG/ACT nasal spray 1 spray into the nostril(s) as directed by provider Daily.     No current facility-administered medications on file prior to visit.     Current Medications:  Scheduled Meds:  Continuous Infusions:No current facility-administered medications for this visit.    PRN Meds:.    I have  "reviewed the patient's medical history in detail and updated the computerized patient record.  Review and summarization of old records includes:    Past Medical History:   Diagnosis Date   • Abnormal Pap smear of cervix    • Chronic back pain     manages w/ NSAIDS, avoids steroids   • Dyspepsia    • Dyspnea on exertion    • Fatigue    • Joint pain     knee \"bone on bone\"   • Microcytic anemia    • Morbid obesity (HCC)    • Plantar fasciitis         Past Surgical History:   Procedure Laterality Date   •  SECTION  2013    twins   • LEEP          Social History     Occupational History   • Not on file   Tobacco Use   • Smoking status: Never   • Smokeless tobacco: Never   Substance and Sexual Activity   • Alcohol use: No   • Drug use: No   • Sexual activity: Defer     Birth control/protection: None      Social History     Social History Narrative    Lives in [place], [place].  Single with 2 children.  Currently unemployed.         Family History   Problem Relation Age of Onset   • Hypertension Mother    • Hypertension Father    • Sleep apnea Father    • Cancer Paternal Grandfather    • Diabetes Maternal Grandmother        ROS: 14 point review of systems was performed and all other systems were reviewed and are negative except for documented findings in HPI and today's encounter.     Allergies: No Known Allergies  Constitutional:  Denies fever, shaking or chills   Eyes:  Denies change in visual acuity   HENT:  Denies nasal congestion or sore throat   Respiratory:  Denies cough or shortness of breath   Cardiovascular:  Denies chest pain or severe LE edema   GI:  Denies abdominal pain, nausea, vomiting, bloody stools or diarrhea   Musculoskeletal:  Numbness, tingling, pain, or loss of motor function only as noted above in history of present illness.  : Denies painful urination or hematuria  Integument:  Denies rash, lesion or ulceration   Neurologic:  Denies headache or focal weakness  Endocrine:  Denies " "lymphadenopathy  Psych:  Denies confusion or change in mental status   Hem:  Denies active bleeding    OBJECTIVE:  Physical Exam: 47 y.o. female  Wt Readings from Last 3 Encounters:   02/01/23 109 kg (240 lb)   01/30/23 109 kg (240 lb)   06/07/22 108 kg (237 lb 8 oz)     Ht Readings from Last 1 Encounters:   02/01/23 152.4 cm (60\")     Body mass index is 46.87 kg/m².  There were no vitals filed for this visit.  Vital signs reviewed.     General Appearance:    Alert, cooperative, in no acute distress                  Eyes: conjunctiva clear  ENT: external ears and nose atraumatic  CV: no peripheral edema  Resp: normal respiratory effort  Skin: no rashes or wounds; normal turgor  Psych: mood and affect appropriate  Lymph: no nodes appreciated  Neuro: gross sensation intact  Vascular:  Palpable peripheral pulse in noted extremity  Musculoskeletal Extremities: Exam shows crepitation swelling synovitis with some medial joint line tenderness.  Tabitha's causes some tenderness and she walks with a stifflegged gait    Radiology:   2 view knee x-rays taken the epic system reviewed at this time without comparison views available show evidence of at least moderate arthritic change.        Assessment:     ICD-10-CM ICD-9-CM   1. Locking of right knee  M23.91 717.9        MDM/Plan:   The diagnosis(es), natural history, pathophysiology and treatment for diagnosis(es) were discussed. Opportunity given and questions answered.  Biomechanics of pertinent body areas discussed.  When appropriate, the use of ambulatory aids discussed.    Biomechanics of pertinent body areas discussed.  When appropriate, the use of ambulatory aids discussed.  BMI:  The concept of BMI body mass index and its importance and implications discussed.    Inflammation/pain control; with cold, heat, elevation and/or liniments discussed as appropriate  MRI.  CONSULT: This Consult is done at the request of a requesting provider to whom I will send this report " with this rendered opinion.  MEDICAL RECORDS reviewed from other provider(s) for past and current medical history pertinent to this complaint.  Just to get an MRI to define the internal anatomy of the knee.  She has a large tear told her about the possibly of arthroscopic treatment.  Looks like mainly arthritis etc. we could try some different things that were also explained.    2/1/2023    Dictated utilizing Dragon dictation

## 2023-02-21 ENCOUNTER — HOSPITAL ENCOUNTER (OUTPATIENT)
Dept: MRI IMAGING | Facility: HOSPITAL | Age: 48
Discharge: HOME | End: 2023-02-21
Admitting: ORTHOPAEDIC SURGERY
Payer: COMMERCIAL

## 2023-02-21 DIAGNOSIS — M23.91 LOCKING OF RIGHT KNEE: ICD-10-CM

## 2023-02-21 PROCEDURE — 73721 MRI JNT OF LWR EXTRE W/O DYE: CPT

## 2023-09-13 ENCOUNTER — TELEPHONE (OUTPATIENT)
Dept: SURGERY | Facility: OTHER | Age: 48
End: 2023-09-13
Payer: COMMERCIAL

## 2023-09-13 NOTE — TELEPHONE ENCOUNTER
Caller: Crissy Grady    Relationship: Self    Best call back number:[phone]        Who are you requesting to speak with (clinical staff, DR. ULLOA        What was the call regarding: PATIENT ADVISED THAT SHE SAW DR. ULLOA A COUPLE OF YEARS AGO FOR A TUBAL AND THAT HE REFERRED HER TO ANOTHER DOCTOR. SHE WOULD LIKE TO GET THE MEDICAL RECORDS FOR THAT VISIT AND THE NAME OF THE DOCTOR THAT HE REFERRED HER TO SO THAT SHE CAN REQUEST THOSE RECORDS.  PATIENT ALSO ADVISED THAT A RELEASE FORM WILL BE SENT TO US.

## 2023-09-15 NOTE — TELEPHONE ENCOUNTER
Received [facility]ICK[facility] from [facility] today requesting these records. Faxed to [facility] RICKY as these are over 10 years old and off site for our clinic.